# Patient Record
Sex: FEMALE | Race: WHITE | Employment: UNEMPLOYED | URBAN - METROPOLITAN AREA
[De-identification: names, ages, dates, MRNs, and addresses within clinical notes are randomized per-mention and may not be internally consistent; named-entity substitution may affect disease eponyms.]

---

## 2022-10-31 ENCOUNTER — HOSPITAL ENCOUNTER (OUTPATIENT)
Dept: PHYSICAL THERAPY | Age: 72
Discharge: HOME | End: 2022-10-31
Payer: MEDICARE

## 2022-10-31 PROCEDURE — 97530 THERAPEUTIC ACTIVITIES: CPT

## 2022-10-31 PROCEDURE — 97162 PT EVAL MOD COMPLEX 30 MIN: CPT

## 2022-10-31 NOTE — PROGRESS NOTES
PHYSICAL THERAPY - DAILY TREATMENT NOTE    Patient Name: Jerome Rudolph        Date: 10/31/2022  : 1950   YES Patient  Verified  Visit #:     Insurance: Payor: VA MEDICARE / Plan: VA MEDICARE PART A & B / Product Type: Medicare /      In time: 9:05 A Out time: 9:50 A   Total Treatment Time: 45     BCBS/Medicare Time Tracking (below)   Total Timed Codes (min):  45 1:1 Treatment Time:  45     TREATMENT AREA =  Left hip pain [M25.552]  SUBJECTIVE  Pain Level (on 0 to 10 scale):  4  / 10   Medication Changes/New allergies or changes in medical history, any new surgeries or procedures?     NO    If yes, update Summary List   Subjective Functional Status/Changes:  []  No changes reported     See POC            Modalities Rationale:     Patient deferred     min [] Estim, type/location:                                      []  att     []  unatt     []  w/US     []  w/ice    []  w/heat    min []  Mechanical Traction: type/lbs                   []  pro   []  sup   []  int   []  cont    []  before manual    []  after manual    min []  Ultrasound, settings/location:      min []  Iontophoresis w/ dexamethasone, location:                                               []  take home patch       []  in clinic    min []  Ice     []  Heat    location/position:     min []  Vasopneumatic Device, press/temp:    If using vaso (only need to measure limb vaso being performed on)      pre-treatment girth :       post-treatment girth :       measured at (landmark location) :      min []  Other:    [] Skin assessment post-treatment (if applicable):    []  intact    []  redness- no adverse reaction                  []redness - adverse reaction:        10 min Therapeutic Activity: [x]  See flow sheet   Rationale:    increase ROM, increase strength, and increase proprioception to improve the patients ability to return to pain-free bed mobility     Billed With/As:   [] TE   [] TA   [] Neuro   [] Self Care Patient Education: [x] Review HEP    [] Progressed/Changed HEP based on:   [] positioning   [] body mechanics   [] transfers   [] heat/ice application    [] other:      Other Objective/Functional Measures:    Issued initial HEP (supine bent knee fall out), discussed sleeping positioning on R with pillows for support to promote neutral hip position, initiate use of CP at home (hold off on previous use of heat)     Post Treatment Pain Level (on 0 to 10) scale:   4  / 10     ASSESSMENT  Assessment/Changes in Function:     See POC     []  See Progress Note/Recertification   Patient will continue to benefit from skilled PT services to modify and progress therapeutic interventions, address functional mobility deficits, address ROM deficits, address strength deficits, analyze and address soft tissue restrictions, analyze and cue movement patterns, analyze and modify body mechanics/ergonomics, assess and modify postural abnormalities, address imbalance/dizziness, and instruct in home and community integration to attain remaining goals.    Progress toward goals / Updated goals:    Progressing towards goals established at [address] [facility] [id number]  []  Upgrade activities as tolerated YES Continue plan of care   []  Discharge due to :    []  Other:      Therapist: Ernesto Conteh PT    Date: 10/31/2022 Time: 1:30 PM     Future Appointments   Date Time Provider Ayaka Aceves   11/2/2022  9:40 AM Shira Krishnan, PT [facility]   11/8/2022  9:40 AM Ros Camara, PT [facility]   11/11/2022 12:00 PM Shira Krishnan, PT [facility]   11/14/2022 11:40 AM Terry Cullen, PT [facility]   11/17/2022  7:20 AM Terry Cullen, PT [facility]   11/22/2022  9:00 AM Ros Camara, PT [facility]

## 2022-10-31 NOTE — PROGRESS NOTES
[address] [place] [address] PHYSICAL THERAPY AT [address] [place] [address]  [address], [address] ,[address], [address]  Phone: [phone]  Fax: [phone] / [address] [address] PHYSICAL THERAPY SERVICES  Patient Name: Gerhardt Layer : 1950   Medical   Diagnosis: Left hip pain [M25.552] Treatment Diagnosis: L hip pain   Onset Date: 6 mos prior to eval      [facility]   Prior Hospitalization: See medical history Provider #: [id number]   Prior Level of Function: Working out including daily walking & yoga 2 days/week   Comorbidities: R THR 2014, L [facility] 2009, OP, hearing impairment, L knee pain   Medications: Verified on Patient Summary List   The Plan of Care and following information is based on the information from the initial evaluation.   ==================================================================================  Assessment / key information:  Patient is a pleasant 71 y.o. female who presents to [facility] with L hip pain. Patient reports initial onset of sx 6 months ago which were of unknown etiology. She initially noticed c/o pain with yoga & when walking her dogs, she has DC previous yoga program & limits her walking program with her dog. Current c/o pain are constant in nature & worsen upon rising after periods of long sitting, prolonged standing/ambulation as well as c/o pain with ascending stairs (which tends to avoid). She reports having PT from July & 2022 as well as dry needling which helped with some reduction of pain. She reports some reduction of pain since steroid injection in L hip ~1 week ago, as well as injection in L knee last August. She reports her pain had increased to the point when that she was walking with a SPC 2 weeks ago. Sx improve with alleve bid & using once to manage her pain.    Average reported pain level at 4/10, 8/10 at worst & 3/10 at best.  Upon objective evaluation, patient demonstrates L hip AROM as follows 100 degrees in supine, [email or web address] degrees in supine, moderate c/o pain with ER<IR passively. Moderate signs of antalgia on L. C/o pain with rolling on plinth & repositioning, we discussed modifications to sleeping postures to reduce pain at night. MMT revealed L hip flexion 4/5, ABD 3/5, ext 3/5 all planes limited by moderate levels of pain today. Patient can benefit PT interventions to improve balance, gait mechanics, posture, and decrease pain to facilitate return to unlimited ADLs, work activities & overall functional status.   ==================================================================================  Eval Complexity: History HIGH Complexity :3+ comorbidities / personal factors will impact the outcome/ POC ;  Examination  MEDIUM Complexity : 3 Standardized tests and measures addressing body structure, function, activity limitation and / or participation in recreation ; Presentation MEDIUM Complexity : Evolving with changing characteristics ;   Decision Making MEDIUM Complexity : FOTO score of 26-74; Overall Complexity MEDIUM  Problem List: pain affecting function, decrease ROM, decrease strength, edema affecting function, impaired gait/ balance, decrease ADL/ functional abilitiies, decrease activity tolerance, decrease flexibility/ joint mobility, and decrease transfer abilities   Treatment Plan may include any combination of the following: Therapeutic exercise, Neuromuscular reeducation, Manual therapy, Therapeutic activity, Self care/home management, Electric stim unattended , Vasopneumatic device, Aquatic therapy, Gait training, Ultrasound, Mechanical traction, Electric stim attended, Needle insertion w/o injection (1 or 2 muscles), Needle insertion w/o injection (3+ muscles), and Canalith repositioning  Patient / Family readiness to learn indicated by: asking questions, trying to perform skills and interest  Persons(s) to be included in education: patient (P)  Barriers to Learning/Limitations: None  Measures taken:    Patient Goal (s): \"Pain in the hip & knee to goes away able to put full weight on L leg with no pain & do sitting pigeon in yoga\"   Patient self reported health status: excellent  Rehabilitation Potential: good  Short Term Goals: To be accomplished in  2  weeks:  1) Establish HEP to prevent further disability. 2) Patient will report decreased c/o pain to < or = 5-6/10 at worst to facilitate return to pain-free standing with manageable sx in L hip.  3) Improve FOTO score from 47 points to > or = 55 points indicating improved tolerance with ADLs in regards to L hip. 4) Patient to be able to perform FWD step up from 4-6 inch step with good pelvis/knee stability & no c/o pain in preparation for return to stair negotiation. Long Term Goals: To be accomplished in  4  weeks:  1) Improve FOTO score from 55 points to > or = 61 points indicating improved tolerance with ADLs in regards to L hip. 2) Patient to report 75% improvement in overall function in preparation for return to recreational activities with manageable sx in L hip. 3) Patient will be able to negotiate multiple steps using 1 HR & reciprocal pattern with no safety concerns noted. 4) Patient to report no c/o pain with rolling & bed mobility with manageable sx in L hip  Frequency / Duration:   Patient to be seen  2-3  times per week for 4  weeks:  Patient / Caregiver education and instruction: self care, activity modification, brace/ splint application and exercises    Therapist Signature: KATIE Tolbert, taurus MDT Date: 14/03/2450   Certification Period: 10-31-22 to 1-29-23 Time: 9:08 AM   =================================================================================  I certify that the above Physical Therapy Services are being furnished while the patient is under my care.   I agree with the treatment plan and certify that this therapy is necessary.     Physician Signature:                                                             Date:                                     Time:                                                                       Aurora Stuartma

## 2022-11-02 ENCOUNTER — HOSPITAL ENCOUNTER (OUTPATIENT)
Dept: PHYSICAL THERAPY | Age: 72
Discharge: HOME | End: 2022-11-02
Payer: MEDICARE

## 2022-11-02 PROCEDURE — 97116 GAIT TRAINING THERAPY: CPT | Performed by: PHYSICAL THERAPIST

## 2022-11-02 PROCEDURE — 97110 THERAPEUTIC EXERCISES: CPT | Performed by: PHYSICAL THERAPIST

## 2022-11-02 NOTE — PROGRESS NOTES
PHYSICAL THERAPY - DAILY TREATMENT NOTE    Patient Name: Femi Smith        Date: 2022  : 1950   YES Patient  Verified  Visit #:     Insurance: Payor: Ryan Chino / Plan: VA MEDICARE PART A & B / Product Type: Medicare /      In time: 9:40 Out time: 10:25   Total Treatment Time: 45     BCBS/Medicare Time Tracking (below)   Total Timed Codes (min):  45 1:1 Treatment Time:  45     TREATMENT AREA =  Left hip pain [M25.552]    SUBJECTIVE  Pain Level (on 0 to 10 scale):  4  / 10   Medication Changes/New allergies or changes in medical history, any new surgeries or procedures? NO    If yes, update Summary List   Subjective Functional Status/Changes:  []  No changes reported     Pt reports her pain is getting better and she has stopped taking NSAIDs as frequently. She did walk with her dog yesterday and her pain worsened.           Modalities Rationale:     decrease edema, decrease inflammation, and decrease pain to improve patient's ability to prevent soreness   min [] Estim, type/location:                                      []  att     []  unatt     []  w/US     []  w/ice    []  w/heat    min []  Mechanical Traction: type/lbs                   []  pro   []  sup   []  int   []  cont    []  before manual    []  after manual    min []  Ultrasound, settings/location:      min []  Iontophoresis w/ dexamethasone, location:                                               []  take home patch       []  in clinic   HEP min [x]  Ice     []  Heat    location/position: L Hip - supine after session    min []  Vasopneumatic Device, press/temp:     min []  Other:    [] Skin assessment post-treatment (if applicable):    []  intact    []  redness- no adverse reaction     []redness - adverse reaction:        20 min Therapeutic Exercise:  [x]  See flow sheet   Rationale:      increase ROM, increase strength, and improve coordination to improve the patients ability to restore normal walking tolerance       10 min Gait Training:  _40__ feet with _SPC__ device on level surfaces with _S__ level of assistance, pt able to avoid Trendelenburg on L LE when using a SPC in her R hand   Rationale: To improve ambulation safety and efficiency in order to improve patient's ability to safely ambulate at home for self care. Billed With/As:   [] TE   [] TA   [] Neuro   [] Self Care Patient Education: [x] Review HEP    [] Progressed/Changed HEP based on:   [] positioning   [] body mechanics   [] transfers   [] heat/ice application    [] other:      Other Objective/Functional Measures: Added hooklying resisted hip ABD with yellow band (issued for home use)    Added bridging today    Full L knee extension, and no pain when WB through L knee during SLS, but she has difficulty controlling frontal plane stability in standing      Post Treatment Pain Level (on 0 to 10) scale:   4  / 10     ASSESSMENT  Assessment/Changes in Function:     Pt was encouraged to walk with equal gait mechanics, and this could only be done today when using a SPC in R hand. She was educated to avoid taking too big a step, so she could effectively manage her symptoms. []  See Progress Note/Recertification   Patient will continue to benefit from skilled PT services to modify and progress therapeutic interventions, address functional mobility deficits, address ROM deficits, address strength deficits, analyze and address soft tissue restrictions, analyze and cue movement patterns, analyze and modify body mechanics/ergonomics, and assess and modify postural abnormalities to attain remaining goals. Progress toward goals / Updated goals: Showing improved movement mechanics, but still limited in ability to walk without symptoms.        PLAN  [x]  Upgrade activities as tolerated YES Continue plan of care   []  Discharge due to :    []  Other:      Therapist: Maggi Klein PT    Date: 11/2/2022 Time: 8:18 AM     Future Appointments   Date Time Provider Ayaka Aceves   11/2/2022  9:40 AM Sally Rinaldi, PT [facility]   11/8/2022  9:40 AM Jalyn Martinez, PT [facility]   11/11/2022 12:00 PM Sally Rinaldi, PT [facility]   11/14/2022 11:40 AM Jalyn Martinez, PT [facility]   11/17/2022  7:20 AM Jalyn Martinez, PT [facility]   11/22/2022  9:00 AM Raphael Camara, PT [facility]

## 2022-11-08 ENCOUNTER — HOSPITAL ENCOUNTER (OUTPATIENT)
Dept: PHYSICAL THERAPY | Age: 72
Discharge: HOME | End: 2022-11-08
Payer: MEDICARE

## 2022-11-08 PROCEDURE — 97535 SELF CARE MNGMENT TRAINING: CPT

## 2022-11-08 PROCEDURE — 97530 THERAPEUTIC ACTIVITIES: CPT

## 2022-11-08 PROCEDURE — 97110 THERAPEUTIC EXERCISES: CPT

## 2022-11-08 NOTE — PROGRESS NOTES
PHYSICAL THERAPY - DAILY TREATMENT NOTE    Patient Name: Edgar Galicia        Date: 2022  : 1950   YES Patient  Verified  Visit #:   3   of   12  Insurance: Payor: VA MEDICARE / Plan: VA MEDICARE PART A & B / Product Type: Medicare /      In time: 9:45 A Out time: 10:35 A   Total Treatment Time: 50     BCBS/Medicare Time Tracking (below)   Total Timed Codes (min):  40 1:1 Treatment Time:  40     TREATMENT AREA =  Left hip pain [M25.552]    SUBJECTIVE  Pain Level (on 0 to 10 scale):  4  / 10   Medication Changes/New allergies or changes in medical history, any new surgeries or procedures? NO    If yes, update Summary List   Subjective Functional Status/Changes:  []  No changes reported     Patient reports she had some pain after last PT & was on a cane for ~ 3 days. She started to feel a little better on .              Modalities Rationale:     decrease edema, decrease inflammation, and decrease pain to improve patient's ability to return to pain-free ADLs    min [] Estim, type/location:                                      []  att     []  unatt     []  w/US     []  w/ice    []  w/heat    min []  Mechanical Traction: type/lbs                   []  pro   []  sup   []  int   []  cont    []  before manual    []  after manual    min []  Ultrasound, settings/location:      min []  Iontophoresis w/ dexamethasone, location:                                               []  take home patch       []  in clinic   10 min [x]  Ice     []  Heat    location/position: Supine to L hip     min []  Vasopneumatic Device, press/temp:    If using vaso (only need to measure limb vaso being performed on)      pre-treatment girth :       post-treatment girth :       measured at (landmark location) :      min []  Other:    [] Skin assessment post-treatment (if applicable):    [x]  intact    [x]  redness- no adverse reaction                  []redness - adverse reaction:        20 min Therapeutic Exercise:  [x]  See flow sheet   Rationale:      increase ROM and increase strength to improve the patients ability to return to pain-free walking program      10 min Therapeutic Activity: [x]  See flow sheet   Rationale:    increase ROM, increase strength, improve balance, and increase proprioception to improve the patients ability to     10 min Self Care: Reviewed use of shopping cart with ambulation to assist as well as SPC on R to avoid    Rationale:    increase ROM, improve balance, and increase proprioception to improve the patients ability to return to normalized gait      Billed With/As:   [] TE   [] TA   [] Neuro   [] Self Care Patient Education: [x] Review HEP    [] Progressed/Changed HEP based on:   [] positioning   [] body mechanics   [] transfers   [] heat/ice application    [] other:      Other Objective/Functional Measures:    See flow sheet     Post Treatment Pain Level (on 0 to 10) scale:   4  / 10     ASSESSMENT  Assessment/Changes in Function:     Fair tolerance to S/L clam with 1 pillow between legs, unable to tolerate prone hip extension despite use of pillow as well as single knee to chest      []  See Progress Note/Recertification   Patient will continue to benefit from skilled PT services to modify and progress therapeutic interventions, address functional mobility deficits, address ROM deficits, address strength deficits, analyze and address soft tissue restrictions, analyze and cue movement patterns, analyze and modify body mechanics/ergonomics, assess and modify postural abnormalities, address imbalance/dizziness, and instruct in home and community integration to attain remaining goals.    Progress toward goals / Updated goals:    Progressing towards STG 2, STG 1 met      PLAN  []  Upgrade activities as tolerated YES Continue plan of care   []  Discharge due to :    []  Other:      Therapist: Krystal Cage PT    Date: 11/8/2022 Time: 9:45 AM     Future Appointments   Date Time Provider Department [place]   11/11/2022 12:00 PM Carl Sandoval [facility] [place] [facility]   11/14/2022 11:40 AM Phi Cortez, PT [facility]   11/17/2022  7:20 AM Phi Cortez PT [facility]   11/22/2022  9:00 AM Mckinley Camara, PT [facility]

## 2022-11-11 ENCOUNTER — HOSPITAL ENCOUNTER (OUTPATIENT)
Dept: PHYSICAL THERAPY | Age: 72
Discharge: HOME | End: 2022-11-11
Payer: MEDICARE

## 2022-11-11 PROCEDURE — 97110 THERAPEUTIC EXERCISES: CPT

## 2022-11-11 PROCEDURE — 97116 GAIT TRAINING THERAPY: CPT

## 2022-11-11 PROCEDURE — 97530 THERAPEUTIC ACTIVITIES: CPT

## 2022-11-11 NOTE — PROGRESS NOTES
PHYSICAL THERAPY - DAILY TREATMENT NOTE    Patient Name: Farzaneh Limon        Date: 2022  : 1950   YES Patient  Verified  Visit #:     Insurance: Payor: Amber Nones / Plan: VA MEDICARE PART A & B / Product Type: Medicare /      In time: 1000 Out time: 1055   Total Treatment Time: 55     BCBS/Medicare Time Tracking (below)   Total Timed Codes (min):  45 1:1 Treatment Time:  45     TREATMENT AREA =  Left hip pain [M25.552]    SUBJECTIVE  Pain Level (on 0 to 10 scale):  4  / 10   Medication Changes/New allergies or changes in medical history, any new surgeries or procedures? NO    If yes, update Summary List   Subjective Functional Status/Changes:  []  No changes reported     Pt reports hip pain is overshadowed today by severe L knee pain. She had difficulty standing this morning. Modalities Rationale:     decrease edema, decrease inflammation, and decrease pain to improve patient's ability to perform pain-free ADLs.     min [] Estim, type/location:                                      []  att     []  unatt     []  w/US     []  w/ice    []  w/heat    min []  Mechanical Traction: type/lbs                   []  pro   []  sup   []  int   []  cont    []  before manual    []  after manual    min []  Ultrasound, settings/location:      min []  Iontophoresis w/ dexamethasone, location:                                               []  take home patch       []  in clinic   10 min [x]  Ice     []  Heat    location/position: L knee and hip    min []  Vasopneumatic Device, press/temp:    If using vaso (only need to measure limb vaso being performed on)      pre-treatment girth :       post-treatment girth :       measured at (landmark location) :      min []  Other:    [x] Skin assessment post-treatment (if applicable):    [x]  intact    [x]  redness- no adverse reaction                  []redness - adverse reaction:        20 min Therapeutic Exercise:  [x]  See flow sheet   Rationale: increase ROM, increase strength, improve coordination, and increase proprioception to improve the patients ability to perform unlimited ADLs. 15 min Therapeutic Activity: [x]  See flow sheet   Rationale:    increase ROM, increase strength, improve coordination, and increase proprioception to improve the patients ability to sleep without pain    10 min Gait Trainin feet with no AD device on level surfaces with emphasis on glut and quad activation during midstance   Rationale: To improve ambulation safety and efficiency in order to improve patient's ability to safely ambulate at home for self care. Billed With/As:   [x] TE   [] TA   [] Neuro   [] Self Care Patient Education: [x] Review HEP    [] Progressed/Changed HEP based on:   [] positioning   [] body mechanics   [] transfers   [] heat/ice application    [] other:      Other Objective/Functional Measures: Added standing weight shift during, long sit HS and quad set     Post Treatment Pain Level (on 0 to 10) scale:   4  / 10     ASSESSMENT  Assessment/Changes in Function:     Pt was limited today due to inc knee pain that improved after some light stretching. Good performance of weight shifting at parallel bars but pt had significant glut fatigue noted. She was encouraged to monitor soreness levels and educated on difference between sharp/inflammatory pain and ms burn pain and encouraged to stop if exercises become painful.     []  See Progress Note/Recertification   Patient will continue to benefit from skilled PT services to modify and progress therapeutic interventions, address functional mobility deficits, address ROM deficits, address strength deficits, analyze and address soft tissue restrictions, analyze and cue movement patterns, analyze and modify body mechanics/ergonomics, and assess and modify postural abnormalities to attain remaining goals. Progress toward goals / Updated goals:    Progressing towards STG 3.      PLAN  [x]  Upgrade activities as tolerated YES Continue plan of care   []  Discharge due to :    []  Other:      Therapist: Carmen Abreu DPT    Date: 11/11/2022 Time: 12:53 PM     Future Appointments   Date Time Provider Ayaka Aceves   11/14/2022 11:40 AM Mary Fernandez, PT [facility]   11/17/2022  7:20 AM Mary Fernandez, PT [facility]   11/22/2022  9:00 AM Jaye Camara, PT [facility]

## 2022-11-14 ENCOUNTER — HOSPITAL ENCOUNTER (OUTPATIENT)
Dept: PHYSICAL THERAPY | Age: 72
Discharge: HOME | End: 2022-11-14
Payer: MEDICARE

## 2022-11-14 PROCEDURE — 97112 NEUROMUSCULAR REEDUCATION: CPT

## 2022-11-14 PROCEDURE — 97110 THERAPEUTIC EXERCISES: CPT

## 2022-11-14 PROCEDURE — 97116 GAIT TRAINING THERAPY: CPT

## 2022-11-14 NOTE — PROGRESS NOTES
PHYSICAL THERAPY - DAILY TREATMENT NOTE    Patient Name: Kentrell Ireland        Date: 2022  : 1950   YES Patient  Verified  Visit #:     Insurance: Payor: Evie Gomez / Plan: [place] MEDICARE PART A & B / Product Type: Medicare /      In time: 11:40 A Out time: 12:30 P   Total Treatment Time: 50     BCBS/Medicare Time Tracking (below)   Total Timed Codes (min):  40 1:1 Treatment Time:  40     TREATMENT AREA =  Left hip pain [M25.552]    SUBJECTIVE  Pain Level (on 0 to 10 scale):  5  / 10   Medication Changes/New allergies or changes in medical history, any new surgeries or procedures? NO    If yes, update Summary List   Subjective Functional Status/Changes:  []  No changes reported     Patient reports she did well after last PT but she had pain over the weekend.   She has a lot of pain in her  knee & hip, she had her last injection for her L knee in August.          Modalities Rationale:     decrease edema, decrease inflammation, and decrease pain to improve patient's ability to return to pain-free ADLs    min [] Estim, type/location:                                      []  att     []  unatt     []  w/US     []  w/ice    []  w/heat    min []  Mechanical Traction: type/lbs                   []  pro   []  sup   []  int   []  cont    []  before manual    []  after manual    min []  Ultrasound, settings/location:      min []  Iontophoresis w/ dexamethasone, location:                                               []  take home patch       []  in clinic   10 min [x]  Ice     []  Heat    location/position: Supine to L hip & L knee     min []  Vasopneumatic Device, press/temp:    If using vaso (only need to measure limb vaso being performed on)      pre-treatment girth :       post-treatment girth :       measured at (landmark location) :      min []  Other:    [] Skin assessment post-treatment (if applicable):    [x]  intact    [x]  redness- no adverse reaction                  []redness - adverse reaction:        20 min Therapeutic Exercise:  [x]  See flow sheet   Rationale:      increase ROM and increase strength to improve the patients ability to return to pain-free standing     10 min Neuromuscular Re-ed: [x]  See flow sheet   Rationale:    improve balance to improve the patients ability to return to ambulation with = weightbearing    10 min Gait Training:  _200__ feet with __SPC on R_ device on level surfaces with __supervision_ level of assistance   Rationale: To improve ambulation safety and efficiency in order to improve patient's ability to safely ambulate at home for self care. Billed With/As:   [] TE   [] TA   [] Neuro   [] Self Care Patient Education: [x] Review HEP    [] Progressed/Changed HEP based on:   [] positioning   [] body mechanics   [] transfers   [] heat/ice application    [] other:      Other Objective/Functional Measures: Added standing hip ABD today, reviewed consistent use of SPC to improve gait mechanics     Post Treatment Pain Level (on 0 to 10) scale:   4  / 10     ASSESSMENT  Assessment/Changes in Function:     Slow progress with pain reduction      []  See Progress Note/Recertification   Patient will continue to benefit from skilled PT services to modify and progress therapeutic interventions, address functional mobility deficits, address ROM deficits, address strength deficits, analyze and address soft tissue restrictions, analyze and cue movement patterns, analyze and modify body mechanics/ergonomics, assess and modify postural abnormalities, address imbalance/dizziness, and instruct in home and community integration to attain remaining goals.    Progress toward goals / Updated goals:    Progressing towards STG 2     PLAN  []  Upgrade activities as tolerated YES Continue plan of care   []  Discharge due to :    []  Other:      Therapist: Krystal Cage PT    Date: 11/14/2022 Time: 12:31 PM     Future Appointments   Date Time Provider Ayaka Aceves   11/17/2022 7:20 AM Sanjuanita Asher, PT [facility]   11/22/2022  9:00 AM Lolly Camara, PT [facility]

## 2022-11-17 ENCOUNTER — HOSPITAL ENCOUNTER (OUTPATIENT)
Dept: PHYSICAL THERAPY | Age: 72
Discharge: HOME | End: 2022-11-17
Payer: MEDICARE

## 2022-11-17 PROCEDURE — 97110 THERAPEUTIC EXERCISES: CPT

## 2022-11-17 PROCEDURE — 97112 NEUROMUSCULAR REEDUCATION: CPT

## 2022-11-17 PROCEDURE — 97535 SELF CARE MNGMENT TRAINING: CPT

## 2022-11-17 NOTE — PROGRESS NOTES
[address] [place] [address] PHYSICAL THERAPY AT [address] [place] [address]  [address], [address] ,[address], [address]  Phone: [phone]  Fax: [phone]  PROGRESS NOTE  Patient Name: Taylor Jimenez : 1950   Treatment/Medical Diagnosis: Left hip pain [M25.552]   Referral Source: [place], [place]     Date of Initial Visit: 10-31-22 Attended Visits: 6 Missed Visits: 0     SUMMARY OF TREATMENT  Therapeutic exercise including ROM, stretching, gentle strengthening, gait training, postural ed, patient education, HEP instruction, CP. CURRENT STATUS  Mrs. Asher Kent reports recent increase in L knee pain over the last 2 weeks which has limited progression of treatment in regards to L hip. C/o L knee pain is fairly constant in nature with average pain level at 9/10. She has been encouraged to be use SPC more & previous c/o L hip pain have now improved. Average pain level in L hip at 3/10, 5/10 at worst.  She was taking alleve 4xs/day for 2 weeks & reported no reduction in overall pain, she DC taking these this past week. She is unable to walk her dog & is avoiding all ambulation outside of her home. She reports 70% reduction in L knee pain after steroid injection last August, but reduction of pain was temporary in nature. This past week she has been experiencing c/o pain at night that is disrupting her sleep. We have discussed modifications to current home program & use of SPC to decrease c/o L LE pain. Goal/Measure of Progress Goal Met? 1.  Establish HEP to prevent further disability. Status at last Eval: NA Current Status: HEP established  yes   2. Patient will report decreased c/o pain to < or = 5-6/10 at worst to facilitate return to pain-free standing with manageable sx in L hip. Status at last Eval: 8/10 at worst Current Status: 5/10 at worst (although now walking assisted with [facility]) progressing   3.   Improve FOTO score from 47 points to > or = 55 points indicating improved tolerance with ADLs in regards to L hip. Status at last Eval: 47 Current Status: To be assessed  progressing     New Goals to be achieved in __3-4__  weeks:  1. Patient will report decreased c/o pain to < or = 5-6/10 at worst to facilitate return to pain-free standing with manageable sx in L hip. 2.  Improve FOTO score from 47 points to > or = 55 points indicating improved tolerance with ADLs in regards to L hip. 3.  Patient to be able to perform FWD step up from 4-6 inch step with good pelvis/knee stability & no c/o pain in preparation for return to stair negotiation. 4.  Patient will ambulate without SPC for limited community ambulation feet with no signs of antalgia on L.       RECOMMENDATIONS  Patient scheduled for follow up with MD on 11-22-22, please indicate any changes to current treatment regarding persistent c/o L knee pain. If you have any questions/comments please contact us directly at [phone]. Thank you for allowing us to assist in the care of your patient. Therapist Signature: KATIE Sy, cert MDT Date: 07/56/7293   Certification Period:  Reporting Period: 11-17-22 to 2-15-23  10-31-22 to 11-17-22 Time: 7:25 AM     NOTE TO PHYSICIAN:  PLEASE COMPLETE THE ORDERS BELOW AND FAX TO   [facility] [place] [facility]: [phone]. If you are unable to process this request in 24 hours please contact our office: [phone].    ___ I have read the above report and request that my patient continue as recommended.   ___ I have read the above report and request that my patient continue therapy with the following changes/special instructions:_________________________________________________________   ___ I have read the above report and request that my patient be discharged from therapy.      Physician Signature:                                                             Date:                                     Time: Baylee Stovall [place]

## 2022-11-17 NOTE — PROGRESS NOTES
PHYSICAL THERAPY - DAILY TREATMENT NOTE    Patient Name: Bhupendra Parr        Date: 2022  : 1950   YES Patient  Verified  Visit #:     Insurance: Payor: Gilbert Russo / Plan: [place] MEDICARE PART A & B / Product Type: Medicare /      In time: 7:20 A Out time: 8:10 A   Total Treatment Time: 50     BCBS/Medicare Time Tracking (below)   Total Timed Codes (min):  40 1:1 Treatment Time:  40     TREATMENT AREA =  Left hip pain [M25.552]    SUBJECTIVE  Pain Level (on 0 to 10 scale):  4  / 10   Medication Changes/New allergies or changes in medical history, any new surgeries or procedures?     NO    If yes, update Summary List   Subjective Functional Status/Changes:  []  No changes reported     See progress note to MD            Modalities Rationale:     decrease edema, decrease inflammation, and decrease pain to improve patient's ability to return to pain-free ADLs    min [] Estim, type/location:                                      []  att     []  unatt     []  w/US     []  w/ice    []  w/heat    min []  Mechanical Traction: type/lbs                   []  pro   []  sup   []  int   []  cont    []  before manual    []  after manual    min []  Ultrasound, settings/location:      min []  Iontophoresis w/ dexamethasone, location:                                               []  take home patch       []  in clinic   10 min [x]  Ice     []  Heat    location/position: Supine to L knee & hip    min []  Vasopneumatic Device, press/temp:    If using vaso (only need to measure limb vaso being performed on)      pre-treatment girth :       post-treatment girth :       measured at (landmark location) :      min []  Other:    [] Skin assessment post-treatment (if applicable):    [x]  intact    [x]  redness- no adverse reaction                  []redness - adverse reaction:        15 min Therapeutic Exercise:  [x]  See flow sheet   Rationale:      increase ROM and increase strength to improve the patients ability to return to pain-free ADLs     15 min Self Care: Reviewed modifications on current HEP given c/o knee pain, plan to hold off on PT until after scheduled follow up with MD (and scheduled cataracts surgery)   Rationale:    increase ROM and increase strength to improve the patients ability to return to pain-free walking program    10 min Neuromuscular Re-ed: [x]  See flow sheet   Rationale:    improve coordination, improve balance, and increase proprioception to improve the patients ability to return to standing with = weightbearing on L LE      Billed With/As:   [] TE   [] TA   [] Neuro   [] Self Care Patient Education: [x] Review HEP    [] Progressed/Changed HEP based on:   [] positioning   [] body mechanics   [] transfers   [] heat/ice application    [] other:      Other Objective/Functional Measures:    See flow sheet     Post Treatment Pain Level (on 0 to 10) scale:   4  / 10     ASSESSMENT  Assessment/Changes in Function:     C/o L knee pain with transitioning from flexed to extended position of L knee in nonweightbearing, reduction of L knee pain since consistent use of SPC      []  See Progress Note/Recertification   Patient will continue to benefit from skilled PT services to modify and progress therapeutic interventions, address functional mobility deficits, address ROM deficits, address strength deficits, analyze and address soft tissue restrictions, analyze and cue movement patterns, analyze and modify body mechanics/ergonomics, assess and modify postural abnormalities, and instruct in home and community integration to attain remaining goals.    Progress toward goals / Updated goals:    See progress note for progress with goals      PLAN  []  Upgrade activities as tolerated YES Continue plan of care   []  Discharge due to :    []  Other:      Therapist: Luis Cadena PT    Date: 11/17/2022 Time: 7:24 AM     Future Appointments   Date Time Provider Ayaka Aceves   11/22/2022  9:00 AM [facility]   12/8/2022 10:40 AM Ramon Hanley, PT [facility]   12/15/2022 12:40 PM Ramon Hanley, PT [facility]   12/22/2022 12:00 PM Ramon Hanley, PT [facility]   12/29/2022 12:00 PM Elza Camara, PT [facility]

## 2022-11-22 ENCOUNTER — APPOINTMENT (OUTPATIENT)
Dept: PHYSICAL THERAPY | Age: 72
End: 2022-11-22
Payer: MEDICARE

## 2022-12-08 ENCOUNTER — APPOINTMENT (OUTPATIENT)
Dept: PHYSICAL THERAPY | Age: 72
End: 2022-12-08

## 2022-12-15 ENCOUNTER — APPOINTMENT (OUTPATIENT)
Dept: PHYSICAL THERAPY | Age: 72
End: 2022-12-15

## 2022-12-22 ENCOUNTER — APPOINTMENT (OUTPATIENT)
Dept: PHYSICAL THERAPY | Age: 72
End: 2022-12-22

## 2022-12-29 ENCOUNTER — APPOINTMENT (OUTPATIENT)
Dept: PHYSICAL THERAPY | Age: 72
End: 2022-12-29

## 2023-01-12 ENCOUNTER — HOSPITAL ENCOUNTER (OUTPATIENT)
Dept: PHYSICAL THERAPY | Age: 73
Discharge: HOME | End: 2023-01-12
Payer: MEDICARE

## 2023-01-12 PROCEDURE — 97110 THERAPEUTIC EXERCISES: CPT

## 2023-01-12 PROCEDURE — 97162 PT EVAL MOD COMPLEX 30 MIN: CPT

## 2023-01-12 NOTE — PROGRESS NOTES
[address] [place] [address] PHYSICAL THERAPY AT [address] [place] [address]  [facility], [address] [place] [address], [address]  Phone: [phone]  Fax: [phone] / [address] [address] PHYSICAL THERAPY SERVICES  Patient Name: Linda Montana : 1950   Medical   Diagnosis: Left knee pain [M25.562] Treatment Diagnosis: L knee pain   Onset Date: chronic     Referral Source: Neri Aceves MD [facility] of [facility]): 2023   Prior Hospitalization: See medical history Provider #: [id number]   Prior Level of Function: Limited with prolonged walking    Comorbidities: H/o LBP & L hip pain & R knee pain   Medications: Verified on Patient Summary List   The Plan of Care and following information is based on the information from the initial evaluation.   ==================================================================================  Assessment / key information:  Patient is a pleasant 71 y.o. female who presents to [facility] with L knee pain. She was last seen for PT in mid November & was DC due to persistent c/o L knee & hip pain. Sx have improved since steroid injection on 22 & she is scheduled for L TKR on 3-01-23. She has since returned to yoga classes 2xs/week but is still unable to walk her dog, she is no longer using [facility] for support. Average reported pain level at 2-3/10, 4/10 at worst & 2/10 at best.  Upon objective evaluation, patient demonstrates L knee AROM as follows 2-135 degrees, she is able to achieve full passive extension with mild pain with passive overpressure to L knee flexion>extension. MMT revealed  improved hip strength 4/5 for L hip ABD, ext & 4+/5 hip flexion. Overall L knee strength 4+/5, good quad set noted today mild c/o anterior knee pain with quad set.    Patient can benefit PT interventions to improve strength, decrease pain & swelling & improve gait mechanics to facilitate return to unlimited ADLs, work activities & overall functional status.   ==================================================================================  Eval Complexity: History HIGH Complexity :3+ comorbidities / personal factors will impact the outcome/ POC ;  Examination  MEDIUM Complexity : 3 Standardized tests and measures addressing body structure, function, activity limitation and / or participation in recreation ; Presentation MEDIUM Complexity : Evolving with changing characteristics ; Decision Making MEDIUM Complexity : FOTO score of 26-74; Overall Complexity MEDIUM  Problem List: pain affecting function, decrease ROM, decrease strength, edema affecting function, impaired gait/ balance, decrease ADL/ functional abilitiies, decrease activity tolerance, decrease flexibility/ joint mobility, and decrease transfer abilities   Treatment Plan may include any combination of the following: Therapeutic exercise, Neuromuscular reeducation, Manual therapy, Therapeutic activity, Self care/home management, Electric stim unattended , Vasopneumatic device, Aquatic therapy, Gait training, Ultrasound, Mechanical traction, Electric stim attended, and Needle insertion w/o injection (1 or 2 muscles)  Patient / Family readiness to learn indicated by: asking questions, trying to perform skills and interest  Persons(s) to be included in education: patient (P)  Barriers to Learning/Limitations: None  Measures taken:    Patient Goal (s): \"Strengthen muscles in both knees in preparation for TKR 3/01/23\"   Patient self reported health status: excellent  Rehabilitation Potential: good  Short Term Goals: To be accomplished in  2  weeks:  1) Establish HEP to prevent further disability. 2) Patient will report decreased c/o pain to < or = 2/10 to facilitate return to walking program with manageable sx in L knee. 3) Establish baseline FOTO to determine current level of function.   4) Patient will be able to maintain L SLS for 10 seconds indicating improved use of  balance strategy for safety with ambulation in challenging environments. Long Term Goals: To be accomplished in  4  weeks:  1) Patient will be able to achieve full passive knee extension on L to normalize gait mechanics. 2) Patient to be independent & compliant with HEP in preparation for D/C.  3) Patient to be able to perform FWD step down from 4 inch step with good pelvis/knee stability & no c/o pain in preparation for return to rec stair negotiation. Frequency / Duration:   Patient to be seen  2-3  times per week for 4  weeks:  Patient / Caregiver education and instruction: self care, activity modification, brace/ splint application and exercises    Therapist Signature: KATIE Chappell cert MDT Date: 2/93/5157   Certification Period: 1-12-23 to 4-10-23 Time: 12:10 PM   =================================================================================  I certify that the above Physical Therapy Services are being furnished while the patient is under my care. I agree with the treatment plan and certify that this therapy is necessary.     Physician Signature:                                                             Date:                                     Time:                                                                       Rafaela Israel MD

## 2023-01-12 NOTE — PROGRESS NOTES
PHYSICAL THERAPY - DAILY TREATMENT NOTE    Patient Name: Renate Napier        Date: 2023  : 1950   YES Patient  Verified  Visit #:     Insurance: Payor: [place] MEDICARE / Plan: VA MEDICARE PART A & B / Product Type: Medicare /      In time: 12:05 P Out time: 1:00 P   Total Treatment Time: 55     BCBS/Medicare Time Tracking (below)   Total Timed Codes (min):  55 1:1 Treatment Time:  55     TREATMENT AREA = Left knee pain [M25.562]    SUBJECTIVE  Pain Level (on 0 to 10 scale):  3  / 10   Medication Changes/New allergies or changes in medical history, any new surgeries or procedures?     NO    If yes, update Summary List   Subjective Functional Status/Changes:  []  No changes reported     See POC          Modalities Rationale:     Patient deferred   min [] Estim, type/location:                                      []  att     []  unatt     []  w/US     []  w/ice    []  w/heat    min []  Mechanical Traction: type/lbs                   []  pro   []  sup   []  int   []  cont    []  before manual    []  after manual    min []  Ultrasound, settings/location:      min []  Iontophoresis w/ dexamethasone, location:                                               []  take home patch       []  in clinic    min []  Ice     []  Heat    location/position:     min []  Vasopneumatic Device, press/temp:    If using vaso (only need to measure limb vaso being performed on)      pre-treatment girth :       post-treatment girth :       measured at (landmark location) :      min []  Other:    [] Skin assessment post-treatment (if applicable):    []  intact    []  redness- no adverse reaction                  []redness - adverse reaction:        15 min Therapeutic Exercise:  [x]  See flow sheet   Rationale:      increase ROM and increase strength to improve the patients ability to return to pain-free walking program      Billed With/As:   [] TE   [] TA   [] Neuro   [] Self Care Patient Education: [x] Review HEP    [] Progressed/Changed HEP based on:   [] positioning   [] body mechanics   [] transfers   [] heat/ice application    [] other:      Other Objective/Functional Measures:    See flow sheet     Post Treatment Pain Level (on 0 to 10) scale:   3  / 10     ASSESSMENT  Assessment/Changes in Function:     See POC      []  See Progress Note/Recertification   Patient will continue to benefit from skilled PT services to modify and progress therapeutic interventions, address functional mobility deficits, address ROM deficits, address strength deficits, analyze and address soft tissue restrictions, analyze and cue movement patterns, analyze and modify body mechanics/ergonomics, assess and modify postural abnormalities, address imbalance/dizziness, and instruct in home and community integration to attain remaining goals.    Progress toward goals / Updated goals:    Progressing towards goals established at [address] [facility] [id number]  []  Upgrade activities as tolerated YES Continue plan of care   []  Discharge due to :    []  Other:      Therapist: Karene Hodgkin, PT    Date: 1/12/2023 Time: 1:34 PM     Future Appointments   Date Time Provider Ayaka Aceves   1/17/2023 11:30 AM Tameka Bell, PT [facility]   1/19/2023 11:30 AM Tameka Bell, PT [facility]   1/24/2023 11:30 AM Tameka Bell, PT [facility]   1/26/2023 11:00 AM Tameka Bell, PT [facility]   1/31/2023 11:00 AM Vito-CruzReyes Paganini, PT [facility]   2/2/2023 11:30 AM Luz Camara, PT [facility]   2/7/2023 11:30 AM [facility] PT STEVE 1 [facility]   2/9/2023 11:30 AM [facility] PT STEVE 1 [facility]   2/14/2023 11:30 AM [facility] PT REDMILL 1 [facility]   2/16/2023 11:30 AM [facility] PT REDMILL 1 [facility]

## 2023-01-17 ENCOUNTER — HOSPITAL ENCOUNTER (OUTPATIENT)
Dept: PHYSICAL THERAPY | Age: 73
Discharge: HOME | End: 2023-01-17
Payer: MEDICARE

## 2023-01-17 PROCEDURE — 97140 MANUAL THERAPY 1/> REGIONS: CPT

## 2023-01-17 PROCEDURE — 97110 THERAPEUTIC EXERCISES: CPT

## 2023-01-17 PROCEDURE — 97112 NEUROMUSCULAR REEDUCATION: CPT

## 2023-01-17 NOTE — PROGRESS NOTES
PHYSICAL THERAPY - DAILY TREATMENT NOTE    Patient Name: Serjio Peterson        Date: 2023  : 1950   yes Patient  Verified  Visit #:     Insurance: Payor: Domingo Radford / Plan: [place] & B / Product Type: Medicare /      In time: 3711 Out time: 2490   Total Treatment Time: 45     Medicare/BCBS Time Tracking (below)   Total Timed Codes (min):  45 1:1 Treatment Time:  45     TREATMENT AREA =  Left knee pain [M25.562]    SUBJECTIVE  Pain Level (on 0 to 10 scale):  3  / 10   Medication Changes/New allergies or changes in medical history, any new surgeries or procedures?   no If yes, update Summary List   Subjective Functional Status/Changes:  []  No changes reported     Cortisone is still kickin in. OBJECTIVE  Modalities Rationale:     decrease inflammation, decrease pain, and increase tissue extensibility to improve patient's ability to perform functional mobility activities with decrease c/o symptoms.    min [] Estim, type/location:                                      []  att     []  unatt     []  w/US     []  w/ice    []  w/heat    min []  Mechanical Traction: type/lbs                   []  pro   []  sup   []  int   []  cont    []  before manual    []  after manual    min []  Ultrasound, settings/location:      min []  Iontophoresis w/ dexamethasone, location:                                               []  take home patch       []  in clinic    min []  Ice     []  Heat    location/position:     min []  Vasopneumatic Device, press/temp:    If using vaso (only need to measure limb vaso being performed on)      pre-treatment girth :       post-treatment girth :       measured at (landmark location) :      min []  Other:    [x] Skin assessment post-treatment (if applicable):    [x]  intact    []  redness- no adverse reaction                  []redness - adverse reaction:      20 min Therapeutic Exercise:  [x]  See flow sheet   Rationale:      increase ROM, increase strength, improve coordination, and improve balance to improve the patients ability to perform functional mobility activities with decrease c/o symptoms. 10 min Manual Therapy: PROM, HS stretch, STM   Rationale:      decrease pain, increase ROM, and increase tissue extensibility to improve patient's ability to perform functional mobility activities with decrease c/o symptoms. The manual therapy interventions were performed at a separate and distinct time from the therapeutic activities interventions. min Therapeutic Activity: [x]  See flow sheet   Rationale:    increase ROM, increase strength, improve coordination, and improve balance to improve the patients ability to perform functional mobility activities with decrease c/o symptoms. 15 min Neuromuscular Re-ed: [x]  See flow sheet   Rationale:    increase ROM, increase strength, improve coordination, and improve balance to improve the patients ability to perform functional mobility activities with decrease c/o symptoms     min Self Care:    Rationale:    increase ROM, increase strength, and improve coordination to improve the patients ability to perform functional mobility activities with decrease c/o symptoms. Billed With/As:   [x] TE   [] TA   [] Neuro   [] Self Care Patient Education: [x] Review HEP    [] Progressed/Changed HEP based on:   [] positioning   [] body mechanics   [] transfers   [] heat/ice application    [] other:      Other Objective/Functional Measures:    No briseno ein functional measurements today. Post Treatment Pain Level (on 0 to 10) scale:   2  / 10     ASSESSMENT  Assessment/Changes in Function:     Overall good tolerance to all therapeutic interventions for first treatment session.      []  See Progress Note/Recertification   Patient will continue to benefit from skilled PT services to modify and progress therapeutic interventions, address functional mobility deficits, address ROM deficits, address strength deficits, analyze and address soft tissue restrictions, and analyze and cue movement patterns to attain remaining goals. Progress toward goals / Updated goals:    Short Term Goals: To be accomplished in  2  weeks:  1) Establish HEP to prevent further disability. 2) Patient will report decreased c/o pain to < or = 2/10 to facilitate return to walking program with manageable sx in L knee. 3) Establish baseline FOTO to determine current level of function. 4) Patient will be able to maintain L SLS for 10 seconds indicating improved use of  balance strategy for safety with ambulation in challenging environments. Long Term Goals: To be accomplished in  4  weeks:  1) Patient will be able to achieve full passive knee extension on L to normalize gait mechanics. 2) Patient to be independent & compliant with HEP in preparation for D/C.  3) Patient to be able to perform FWD step down from 4 inch step with good pelvis/knee stability & no c/o pain in preparation for return to rec stair negotiation.       PLAN  [x]  Upgrade activities as tolerated yes Continue plan of care   []  Discharge due to :    []  Other:      Therapist: Drois Haji PTA    Date: 1/17/2023 Time: 10:39 AM     Future Appointments   Date Time Provider Ayaka Aceves   1/17/2023 11:30 AM [facility] PT [place] [facility]   1/19/2023 11:30 AM Minnie Mendez, PT [facility]   1/24/2023 11:30 AM Minnie Mendez, PT [facility]   1/26/2023 11:00 AM Minnie Mendez, PT [facility]   1/31/2023 11:00 AM Natalie Camara, PT [facility]   2/2/2023 11:30 AM Luz Camara, PT [facility]   2/7/2023 11:30 AM [facility] PT [place] 1 [facility]   2[facility]9/2023 11:30 AM [facility] PT [place] 1 [facility]   2/14/2023 11:30 AM [facility] PT [place] 1 [facility]   2/16/2023 11:30 AM [facility] PT [place] 1 [facility]

## 2023-01-19 ENCOUNTER — HOSPITAL ENCOUNTER (OUTPATIENT)
Dept: PHYSICAL THERAPY | Age: 73
Discharge: HOME | End: 2023-01-19
Payer: MEDICARE

## 2023-01-19 PROCEDURE — 97112 NEUROMUSCULAR REEDUCATION: CPT

## 2023-01-19 PROCEDURE — 97140 MANUAL THERAPY 1/> REGIONS: CPT

## 2023-01-19 PROCEDURE — 97110 THERAPEUTIC EXERCISES: CPT

## 2023-01-19 NOTE — PROGRESS NOTES
PHYSICAL THERAPY - DAILY TREATMENT NOTE    Patient Name: Edgardo Garay        Date: 2023  : 1950   yes Patient  Verified  Visit #:   3   of   12  Insurance: Payor: [facility] [employer] / Plan: [place] MEDICARE PART A & B / Product Type: Medicare /      In time: 8913 Out time:    Total Treatment Time: 45     Medicare/BCBS Time Tracking (below)   Total Timed Codes (min):  45 1:1 Treatment Time:  45     TREATMENT AREA =  Left knee pain [M25.562]    SUBJECTIVE  Pain Level (on 0 to 10 scale):  0 / 10   Medication Changes/New allergies or changes in medical history, any new surgeries or procedures?   no If yes, update Summary List   Subjective Functional Status/Changes:  []  No changes reported     I'm actually doing good today. OBJECTIVE  Modalities Rationale:     decrease inflammation, decrease pain, and increase tissue extensibility to improve patient's ability to perform functional mobility activities with decrease c/o symptoms.    min [] Estim, type/location:                                      []  att     []  unatt     []  w/US     []  w/ice    []  w/heat    min []  Mechanical Traction: type/lbs                   []  pro   []  sup   []  int   []  cont    []  before manual    []  after manual    min []  Ultrasound, settings/location:      min []  Iontophoresis w/ dexamethasone, location:                                               []  take home patch       []  in clinic    min []  Ice     []  Heat    location/position:     min []  Vasopneumatic Device, press/temp:    If using vaso (only need to measure limb vaso being performed on)      pre-treatment girth :       post-treatment girth :       measured at (landmark location) :      min []  Other:    [x] Skin assessment post-treatment (if applicable):    [x]  intact    []  redness- no adverse reaction                  []redness - adverse reaction:      20 min Therapeutic Exercise:  [x]  See flow sheet   Rationale:      increase ROM, increase strength, improve coordination, and improve balance to improve the patients ability to perform functional mobility activities with decrease c/o symptoms. 10 min Manual Therapy: PROM, HS stretch, STM   Rationale:      decrease pain, increase ROM, and increase tissue extensibility to improve patient's ability to perform functional mobility activities with decrease c/o symptoms. The manual therapy interventions were performed at a separate and distinct time from the therapeutic activities interventions. min Therapeutic Activity: [x]  See flow sheet   Rationale:    increase ROM, increase strength, improve coordination, and improve balance to improve the patients ability to perform functional mobility activities with decrease c/o symptoms. 15 min Neuromuscular Re-ed: [x]  See flow sheet   Rationale:    increase ROM, increase strength, improve coordination, and improve balance to improve the patients ability to perform functional mobility activities with decrease c/o symptoms     min Self Care:    Rationale:    increase ROM, increase strength, and improve coordination to improve the patients ability to perform functional mobility activities with decrease c/o symptoms. Billed With/As:   [x] TE   [] TA   [] Neuro   [] Self Care Patient Education: [x] Review HEP    [] Progressed/Changed HEP based on:   [] positioning   [] body mechanics   [] transfers   [] heat/ice application    [] other:      Other Objective/Functional Measures:    Continue with therx per flow sheet for strengthening and ROM of knee. Post Treatment Pain Level (on 0 to 10) scale:   2  / 10     ASSESSMENT  Assessment/Changes in Function:     No noted change in function reported today. Continues to perform all functional moiblity activities without significant restrictions.      []  See Progress Note/Recertification   Patient will continue to benefit from skilled PT services to modify and progress therapeutic interventions, address functional mobility deficits, address ROM deficits, address strength deficits, analyze and address soft tissue restrictions, and analyze and cue movement patterns to attain remaining goals. Progress toward goals / Updated goals:    Short Term Goals: To be accomplished in  2  weeks:  1) Establish HEP to prevent further disability. 2) Patient will report decreased c/o pain to < or = 2/10 to facilitate return to walking program with manageable sx in L knee. 3) Establish baseline FOTO to determine current level of function. 4) Patient will be able to maintain L SLS for 10 seconds indicating improved use of  balance strategy for safety with ambulation in challenging environments. Long Term Goals: To be accomplished in  4  weeks:  1) Patient will be able to achieve full passive knee extension on L to normalize gait mechanics. 2) Patient to be independent & compliant with HEP in preparation for D/C.  3) Patient to be able to perform FWD step down from 4 inch step with good pelvis/knee stability & no c/o pain in preparation for return to rec stair negotiation.       PLAN  [x]  Upgrade activities as tolerated yes Continue plan of care   []  Discharge due to :    []  Other:      Therapist: Troy Rouse PTA    Date: 1/19/2023 Time: 10:39 AM     Future Appointments   Date Time Provider Ayaka Aceves   1/19/2023 11:30 AM [facility] PT [place] 1 [facility]   1/24/2023 11:30 AM Barbara Nealmadi, PT [facility]   1/26/2023 11:00 AM Lonza Krystle, PT [facility]   1/31/2023 11:00 AM Gianna Camara, PT [facility]   2/2/2023 11:30 AM Luz Camara, PT [facility]   2/7/2023 11:30 AM [facility] PT [place] 1 [facility]   2/9/2023 11[facility]30 AM [facility] PT [place] 1 [facility]   2/14/2023 11:30 AM [facility] PT [place] 1 [facility]   2/16/2023 11:30 AM [facility] PT [place] 1 [facility]

## 2023-01-24 ENCOUNTER — HOSPITAL ENCOUNTER (OUTPATIENT)
Dept: PHYSICAL THERAPY | Age: 73
Discharge: HOME | End: 2023-01-24
Payer: MEDICARE

## 2023-01-24 PROCEDURE — 97112 NEUROMUSCULAR REEDUCATION: CPT

## 2023-01-24 PROCEDURE — 97140 MANUAL THERAPY 1/> REGIONS: CPT

## 2023-01-24 PROCEDURE — 97110 THERAPEUTIC EXERCISES: CPT

## 2023-01-24 NOTE — PROGRESS NOTES
PHYSICAL THERAPY - DAILY TREATMENT NOTE    Patient Name: Prisca Urbina        Date: 2023  : 1950   YES Patient  Verified  Visit #:     Insurance: Payor: [facility] MEDICARE / Plan: [place] MEDICARE PART A & B / Product Type: Medicare /      In time: 11:30 A Out time: 12:20 P   Total Treatment Time: 45     BCBS/Medicare Time Tracking (below)   Total Timed Codes (min):  45 1:1 Treatment Time:  40     TREATMENT AREA =  Left knee pain [M25.562]    SUBJECTIVE  Pain Level (on 0 to 10 scale):  0  / 10   Medication Changes/New allergies or changes in medical history, any new surgeries or procedures? NO    If yes, update Summary List   Subjective Functional Status/Changes:  []  No changes reported     Patient reports she is still doing yoga two days/week, she is having some back pain today. She has some pain in L knee, on occasion she has pain at night.           Modalities Rationale:     Patient deferred   min [] Estim, type/location:                                      []  att     []  unatt     []  w/US     []  w/ice    []  w/heat    min []  Mechanical Traction: type/lbs                   []  pro   []  sup   []  int   []  cont    []  before manual    []  after manual    min []  Ultrasound, settings/location:      min []  Iontophoresis w/ dexamethasone, location:                                               []  take home patch       []  in clinic    min []  Ice     []  Heat    location/position:     min []  Vasopneumatic Device, press/temp:    If using vaso (only need to measure limb vaso being performed on)      pre-treatment girth :       post-treatment girth :       measured at (landmark location) :      min []  Other:    [] Skin assessment post-treatment (if applicable):    []  intact    []  redness- no adverse reaction                  []redness - adverse reaction:        20/  15 min Therapeutic Exercise:  [x]  See flow sheet   Rationale:      increase ROM and increase strength to improve the patients ability to return to pain-free standing      15 min Neuromuscular Re-ed: [x]  See flow sheet   Rationale:    improve coordination, improve balance, and increase proprioception to improve the patients ability to return to ADls with decreased fall risk    10 min Manual Therapy: Gentle hamstring stretch, passive extension on L   Rationale:      increase ROM to improve patient's ability to tolerate ambulation with decreased extensor lag   The manual therapy interventions were performed at a separate and distinct time from the therapeutic activities interventions. Billed With/As:   [] TE   [] TA   [] Neuro   [] Self Care Patient Education: [x] Review HEP    [] Progressed/Changed HEP based on:   [] positioning   [] body mechanics   [] transfers   [] heat/ice application    [] other:      Other Objective/Functional Measures: Added TKE with peach band, SLS with 1 UE for support (see updated HEP)  FOTO 51 points (baseline)      Post Treatment Pain Level (on 0 to 10) scale:   0  / 10     ASSESSMENT  Assessment/Changes in Function:     Increase in L hip pain with supine SLR (held this today & with home program), v/c to maintain level pelvis with SLS      []  See Progress Note/Recertification   Patient will continue to benefit from skilled PT services to modify and progress therapeutic interventions, address functional mobility deficits, address ROM deficits, address strength deficits, analyze and address soft tissue restrictions, analyze and cue movement patterns, analyze and modify body mechanics/ergonomics, assess and modify postural abnormalities, address imbalance/dizziness, and instruct in home and community integration to attain remaining goals.    Progress toward goals / Updated goals:    Progressing towards LTGs & STG 3 met      PLAN  []  Upgrade activities as tolerated YES Continue plan of care   []  Discharge due to :    []  Other:      Therapist: Niko Acosta, PT    Date: 1/24/2023 Time: 11:36 AM     Future Appointments   Date Time Provider Ayaka Aceves   1/26/2023 11:00 AM Soraya Powell, PT [facility]   1/31/2023 11:00 AM Soraya Powell, PT [facility]   2/2/2023 11:30 AM Sunil Camara, PT [facility]   2/7/2023 11:30 AM [facility] PT [place] 1 [facility]   2/9/2023 11:30 AM [facility] PT [place] 1 [facility]   2/14/2023 11:30 AM [facility] PT [place] 1 [facility]   2/16/2023 11:30 AM [facility] PT [place] 1 [facility]

## 2023-01-26 ENCOUNTER — HOSPITAL ENCOUNTER (OUTPATIENT)
Dept: PHYSICAL THERAPY | Age: 73
Discharge: HOME | End: 2023-01-26
Payer: MEDICARE

## 2023-01-26 PROCEDURE — 97140 MANUAL THERAPY 1/> REGIONS: CPT

## 2023-01-26 PROCEDURE — 97110 THERAPEUTIC EXERCISES: CPT

## 2023-01-26 PROCEDURE — 97112 NEUROMUSCULAR REEDUCATION: CPT

## 2023-01-26 NOTE — PROGRESS NOTES
PHYSICAL THERAPY - DAILY TREATMENT NOTE    Patient Name: Valentina Dover        Date: 2023  : 1950   yes Patient  Verified  Visit #:    Insurance: Payor: Sacha Petit / Plan: [place] MEDICARE PART A & B / Product Type: Medicare /      In time: 8845 Out time: 1140   Total Treatment Time: 45     Medicare/BCBS Time Tracking (below)   Total Timed Codes (min):  45 1:1 Treatment Time:  45     TREATMENT AREA =  Left knee pain [M25.562]    SUBJECTIVE  Pain Level (on 0 to 10 scale):  2 / 10   Medication Changes/New allergies or changes in medical history, any new surgeries or procedures?   no If yes, update Summary List   Subjective Functional Status/Changes:  []  No changes reported     Because of yoga yesterday my body hurts all over. She did some new things yesterday in class. Mostly muscle soreness. OBJECTIVE  Modalities Rationale:     decrease inflammation, decrease pain, and increase tissue extensibility to improve patient's ability to perform functional mobility activities with decrease c/o symptoms.    min [] Estim, type/location:                                      []  att     []  unatt     []  w/US     []  w/ice    []  w/heat    min []  Mechanical Traction: type/lbs                   []  pro   []  sup   []  int   []  cont    []  before manual    []  after manual    min []  Ultrasound, settings/location:      min []  Iontophoresis w/ dexamethasone, location:                                               []  take home patch       []  in clinic    min []  Ice     []  Heat    location/position:     min []  Vasopneumatic Device, press/temp:    If using vaso (only need to measure limb vaso being performed on)      pre-treatment girth :       post-treatment girth :       measured at (landmark location) :      min []  Other:    [x] Skin assessment post-treatment (if applicable):    [x]  intact    []  redness- no adverse reaction                  []redness - adverse reaction:      20 min Therapeutic Exercise:  [x]  See flow sheet   Rationale:      increase ROM, increase strength, improve coordination, and improve balance to improve the patients ability to perform functional mobility activities with decrease c/o symptoms. 10 min Manual Therapy: PROM, HS stretch, STM   Rationale:      decrease pain, increase ROM, and increase tissue extensibility to improve patient's ability to perform functional mobility activities with decrease c/o symptoms. The manual therapy interventions were performed at a separate and distinct time from the therapeutic activities interventions. min Therapeutic Activity: [x]  See flow sheet   Rationale:    increase ROM, increase strength, improve coordination, and improve balance to improve the patients ability to perform functional mobility activities with decrease c/o symptoms. 15 min Neuromuscular Re-ed: [x]  See flow sheet   Rationale:    increase ROM, increase strength, improve coordination, and improve balance to improve the patients ability to perform functional mobility activities with decrease c/o symptoms     min Self Care:    Rationale:    increase ROM, increase strength, and improve coordination to improve the patients ability to perform functional mobility activities with decrease c/o symptoms. Billed With/As:   [x] TE   [] TA   [] Neuro   [] Self Care Patient Education: [x] Review HEP    [] Progressed/Changed HEP based on:   [] positioning   [] body mechanics   [] transfers   [] heat/ice application    [] other:      Other Objective/Functional Measures:    AROM parker extension: 0 degrees. Post Treatment Pain Level (on 0 to 10) scale:   0  / 10     ASSESSMENT  Assessment/Changes in Function:     Patient reporting significant improvement with all functional mobility activities and reduction of symptoms. Patient performed SLS on foam x 10 seconds with slight unsteadiness.      []  See Progress Note/Recertification   Patient will continue to benefit from skilled PT services to modify and progress therapeutic interventions, address functional mobility deficits, address ROM deficits, address strength deficits, analyze and address soft tissue restrictions, and analyze and cue movement patterns to attain remaining goals. Progress toward goals / Updated goals:    Short Term Goals: To be accomplished in  2  weeks:  1) Establish HEP to prevent further disability. Achieved 1/26/23  2) Patient will report decreased c/o pain to < or = 2/10 to facilitate return to walking program with manageable sx in L knee. Progressing well 1/26/23  3) Establish baseline FOTO to determine current level of function. 1/26/23 Achieved  4) Patient will be able to maintain L SLS for 10 seconds indicating improved use of  balance strategy for safety with ambulation in challenging environments. Progressing well 1/26/23  Long Term Goals: To be accomplished in  4  weeks:  1) Patient will be able to achieve full passive knee extension on L to normalize gait mechanics. 2) Patient to be independent & compliant with HEP in preparation for D/C.  3) Patient to be able to perform FWD step down from 4 inch step with good pelvis/knee stability & no c/o pain in preparation for return to rec stair negotiation.       PLAN  [x]  Upgrade activities as tolerated yes Continue plan of care   []  Discharge due to :    []  Other:      Therapist: April Ramirez PTA    Date: 1/26/2023 Time: 10:39 AM     Future Appointments   Date Time Provider Ayaka Aceves   1/26/2023 11:00 AM [facility] PT [place] 1 [facility]   1/31/2023 11:00 AM Luz Camara PT [facility]   2/2/2023 11:30 AM [facility] PT [place] 1 [facility]   2/7/2023 11:30 AM [facility] PT [place] 1 [facility]   2/9/2023 11:30 AM [facility] PT [place] 1 [facility]   2/14/2023 11:30 AM [facility] PT [place] 1 [facility]   2/16/2023 11[facility]30 AM [facility] PT [place] 1 [facility]

## 2023-01-31 ENCOUNTER — HOSPITAL ENCOUNTER (OUTPATIENT)
Dept: PHYSICAL THERAPY | Age: 73
Discharge: HOME | End: 2023-01-31
Payer: MEDICARE

## 2023-01-31 PROCEDURE — 97535 SELF CARE MNGMENT TRAINING: CPT

## 2023-01-31 PROCEDURE — 97110 THERAPEUTIC EXERCISES: CPT

## 2023-01-31 PROCEDURE — 97530 THERAPEUTIC ACTIVITIES: CPT

## 2023-01-31 NOTE — PROGRESS NOTES
PHYSICAL THERAPY - DAILY TREATMENT NOTE    Patient Name: Mamadou Garcia        Date: 2023  : 1950   YES Patient  Verified  Visit #:     Insurance: Payor: [place] MEDICARE / Plan: VA MEDICARE PART A & B / Product Type: Medicare /      In time: 11:00 A Out time: 11:55 A   Total Treatment Time: 50     BCBS/Medicare Time Tracking (below)   Total Timed Codes (min):  50 1:1 Treatment Time:  50     TREATMENT AREA =  Left knee pain [M25.562]  SUBJECTIVE  Pain Level (on 0 to 10 scale):  2  / 10   Medication Changes/New allergies or changes in medical history, any new surgeries or procedures? NO    If yes, update Summary List   Subjective Functional Status/Changes:  []  No changes reported     Patient reports her L knee has been doing well, she has more pain in her L hip & lower back, she is still doing yoga.            Modalities Rationale:      Patient deferred   min [] Estim, type/location:                                      []  att     []  unatt     []  w/US     []  w/ice    []  w/heat    min []  Mechanical Traction: type/lbs                   []  pro   []  sup   []  int   []  cont    []  before manual    []  after manual    min []  Ultrasound, settings/location:      min []  Iontophoresis w/ dexamethasone, location:                                               []  take home patch       []  in clinic    min []  Ice     []  Heat    location/position:     min []  Vasopneumatic Device, press/temp:    If using vaso (only need to measure limb vaso being performed on)      pre-treatment girth :       post-treatment girth :       measured at (landmark location) :      min []  Other:    [] Skin assessment post-treatment (if applicable):    []  intact    []  redness- no adverse reaction                  []redness - adverse reaction:        10 min Therapeutic Exercise:  [x]  See flow sheet   Rationale:      increase ROM and increase strength to improve the patients ability to return to pain-free standing 25 min Therapeutic Activity: [x]  See flow sheet  Reviewed various yoga postures in kneeling, standing, SLS & quadraped to prevent increase in LBP & L hip pain   Rationale:    increase ROM and increase strength to improve the patients ability to return to pain-free workout program     15 min Self Care: Reviewed progressive HEP & modifications to prevent worsening of L hip & LBP   Rationale:    increase ROM and increase strength to improve the patients ability to return to pain-free walking program         Billed With/As:   [] TE   [] TA   [] Neuro   [] Self Care Patient Education: [x] Review HEP    [] Progressed/Changed HEP based on:   [] positioning   [] body mechanics   [] transfers   [] heat/ice application    [] other:      Other Objective/Functional Measures:    Overall strength L knee 5/5 no c/o pain upon MMT  AROM: full extension      Post Treatment Pain Level (on 0 to 10) scale:   0  / 10     ASSESSMENT  Assessment/Changes in Function:     Good understanding of modifications to yoga practice to decrease L hip/lower back pain & improve gait mechanics     []  See Progress Note/Recertification   Patient will continue to benefit from skilled PT services to modify and progress therapeutic interventions, address functional mobility deficits, address ROM deficits, address strength deficits, analyze and address soft tissue restrictions, analyze and cue movement patterns, analyze and modify body mechanics/ergonomics, assess and modify postural abnormalities, address imbalance/dizziness, and instruct in home and community integration to attain remaining goals.    Progress toward goals / Updated goals:    Progressing towards STG 2 & STG 4 met      PLAN  []  Upgrade activities as tolerated YES Continue plan of care   []  Discharge due to :    []  Other: Progress note & update HEP n/v      Therapist: Faheem Jain PT    Date: 1/31/2023 Time: 11:23 AM     Future Appointments   Date Time Provider Department Center   2/2/2023 11:30 AM Naeem [facility]ah [facility]   2/7/2023 11:30 AM Aidan Bailey, PTA [facility]   2/9/2023 11:30 AM Aidan Bailey PTA [facility]   2/14/2023 11:30 AM [facility] PT REDMILL 1 [facility]   2/16/2023 11:30 AM [facility] PT REDMILL 1 [facility]

## 2023-02-02 ENCOUNTER — HOSPITAL ENCOUNTER (OUTPATIENT)
Dept: PHYSICAL THERAPY | Age: 73
Discharge: HOME | End: 2023-02-02
Payer: MEDICARE

## 2023-02-02 PROCEDURE — 97530 THERAPEUTIC ACTIVITIES: CPT

## 2023-02-02 PROCEDURE — 97535 SELF CARE MNGMENT TRAINING: CPT

## 2023-02-02 PROCEDURE — 97110 THERAPEUTIC EXERCISES: CPT

## 2023-02-02 NOTE — PROGRESS NOTES
PHYSICAL THERAPY - DAILY TREATMENT NOTE    Patient Name: Liberty Traore        Date: 2023  : 1950   YES Patient  Verified  Visit #:     Insurance: Payor: VA MEDICARE / Plan: VA MEDICARE PART A & B / Product Type: Medicare /      In time: 9:35 A Out time: 10:25 A   Total Treatment Time: 45     BCBS/Medicare Time Tracking (below)   Total Timed Codes (min):  45 1:1 Treatment Time:  45     TREATMENT AREA =  Left knee pain [M25.562]  SUBJECTIVE  Pain Level (on 0 to 10 scale):  2  / 10   Medication Changes/New allergies or changes in medical history, any new surgeries or procedures?     NO    If yes, update Summary List   Subjective Functional Status/Changes:  []  No changes reported     See progress note to MD     Patient reports pain level at 2-3/10, 3/10 at worst, 0/10 at best.            Modalities Rationale:     Patient deferred   min [] Estim, type/location:                                      []  att     []  unatt     []  w/US     []  w/ice    []  w/heat    min []  Mechanical Traction: type/lbs                   []  pro   []  sup   []  int   []  cont    []  before manual    []  after manual    min []  Ultrasound, settings/location:      min []  Iontophoresis w/ dexamethasone, location:                                               []  take home patch       []  in clinic    min []  Ice     []  Heat    location/position:     min []  Vasopneumatic Device, press/temp:    If using vaso (only need to measure limb vaso being performed on)      pre-treatment girth :       post-treatment girth :       measured at (landmark location) :      min []  Other:    [] Skin assessment post-treatment (if applicable):    []  intact    []  redness- no adverse reaction                  []redness - adverse reaction:        10 min Therapeutic Exercise:  [x]  See flow sheet   Rationale:      increase ROM and increase strength to improve the patients ability to return to PLOF     15 min Therapeutic Activity: [x] See flow sheet   Rationale:    increase ROM and increase strength to improve the patients ability to return to yoga without increase in LBP & L hip pain    20 min Self Care: Reviewed modifications to ADLs, gait & yoga fitness program to prevent worsening of gait mechanics & LBP & L hip pain & prep for upcoming surgery    Rationale:    increase ROM, improve coordination, improve balance, and increase proprioception to improve the patients ability to return to pain-free ADLs       Billed With/As:   [] TE   [] TA   [] Neuro   [] Self Care Patient Education: [x] Review HEP    [] Progressed/Changed HEP based on:   [] positioning   [] body mechanics   [] transfers   [] heat/ice application    [] other:      Other Objective/Functional Measures:    FOTO 58 points      Post Treatment Pain Level (on 0 to 10) scale:   2 / 10     ASSESSMENT  Assessment/Changes in Function:     Good understanding of current treatment & surgical preparations      []  See Progress Note/Recertification   Patient will continue to benefit from skilled PT services to modify and progress therapeutic interventions, address functional mobility deficits, address ROM deficits, address strength deficits, analyze and address soft tissue restrictions, analyze and cue movement patterns, analyze and modify body mechanics/ergonomics, assess and modify postural abnormalities, address imbalance/dizziness, and instruct in home and community integration to attain remaining goals.    Progress toward goals / Updated goals:    See progress note for progress with goals      PLAN  []  Upgrade activities as tolerated YES Continue plan of care   []  Discharge due to :    [x]  Other:      Therapist: Elidia Browning PT    Date: 2/2/2023 Time: 9:41 AM     Future Appointments   Date Time Provider Ayaka Aceves   2/7/2023 11:30 AM Danie Loredo PTA [facility]   2/9/2023 11:30 AM Danie Loredo PTA [facility]   2/14/2023 11:30 AM [facility] PT STEVE 1 [facility] 2/16/2023 11:30 AM [facility] PT REDMILL 1 [facility]

## 2023-02-02 NOTE — PROGRESS NOTES
[address] [place] [address] PHYSICAL THERAPY AT [address] [place] [address] [facility], [address] [place] [address], [address] [facility] [address]  Phone: [phone]  Fax: [phone]  PROGRESS NOTE  Patient Name: Gianluca Mitchell : 1950   Treatment/Medical Diagnosis: Left knee pain [M25.562]   Referral Source: Haris Rascon MD     Date of Initial Visit: 23 Attended Visits: 7 Missed Visits: 0     SUMMARY OF TREATMENT  Therapeutic exercise including ROM, stretching, gentle strengthening, stabilization training, postural ed, patient education, HEP instruction, manual therapy, gait training. CURRENT STATUS  Mrs. Jennifer Deras has made good progress with PT, c/o L knee pain are now intermittent in nature. She continues to demonstrate some signs of antagia with ambulation. Emphasis these last 2 treatments on improving posture & gait mechanics given increase in L hip & LBP. Overall strength at 5/5 with no c/o pain upon MMT. Discussed possible DC to HEP in the next 2 weeks. Goal/Measure of Progress Goal Met? 1.  Establish baseline FOTO to determine current level of function. Status at last Eval: Baseline FOTO 51  Current Status: 58 points yes   2. Patient will report decreased c/o pain to < or = 2/10 to facilitate return to walking program with manageable sx in L knee. Status at last Eval: Average 2-3/10  At worst 4/10  At best 2/10 Current Status: Average 2-310  At worst 3/10  At best 0/10 Progressing    3. Patient will be able to maintain L SLS for 10 seconds indicating improved use of  balance strategy for safety with ambulation in challenging environments. Status at last Eval: unable Current Status: Good stability with L SLS 10 secs yes   4. Patient will be able to achieve full passive knee extension on L to normalize gait mechanics. Status at last Eval: 2 degrees lacking full extension Current Status: Full passive extension yes     New Goals to be achieved in __3-4__  treatments:  1.   Patient to be independent & compliant with HEP in preparation for D/C & for upcoming surgery. RECOMMENDATIONS  Patient to be seen for up to 3-4 more weeks (prn) in order to progress towards DC in preparation for upcoming L TKR on 3-01-23. If you have any questions/comments please contact us directly at [phone]. Thank you for allowing us to assist in the care of your patient. Therapist Signature: KATIE Do, cert MDT Date: 1/2/0402   Certification Period:  Reporting Period: 2-02-23 to 4-28-23 1-12-23 to 2-02-23 Time: 1:19 PM     NOTE TO PHYSICIAN:  PLEASE COMPLETE THE ORDERS BELOW AND FAX TO   [facility] Physical Therapy: [phone]. If you are unable to process this request in 24 hours please contact our office: [phone].    ___ I have read the above report and request that my patient continue as recommended.   ___ I have read the above report and request that my patient continue therapy with the following changes/special instructions:_________________________________________________________   ___ I have read the above report and request that my patient be discharged from therapy.      Physician Signature:                                                             Date:                                     Time:                                                                       Lu Vasquez MD

## 2023-02-07 ENCOUNTER — HOSPITAL ENCOUNTER (OUTPATIENT)
Dept: PHYSICAL THERAPY | Age: 73
Discharge: HOME | End: 2023-02-07
Payer: MEDICARE

## 2023-02-07 PROCEDURE — 97535 SELF CARE MNGMENT TRAINING: CPT

## 2023-02-07 PROCEDURE — 97110 THERAPEUTIC EXERCISES: CPT

## 2023-02-07 NOTE — PROGRESS NOTES
PHYSICAL THERAPY - DAILY TREATMENT NOTE    Patient Name: Nkechi Garner        Date: 2023  : 1950   YES Patient  Verified  Visit #:    Insurance: Payor: Grady Rowland / Plan: VA MEDICARE PART A & B / Product Type: Medicare /      In time: 61 Out time: 1200   Total Treatment Time: 30     BCBS/Medicare Time Tracking (below)   Total Timed Codes (min):  30 1:1 Treatment Time:  30     TREATMENT AREA =  Left knee pain [M25.562]  SUBJECTIVE  Pain Level (on 0 to 10 scale): 1 / 10   Medication Changes/New allergies or changes in medical history, any new surgeries or procedures? NO    If yes, update Summary List   Subjective Functional Status/Changes:  []  No changes reported     Pain isn't really enough to mention.           Modalities Rationale:     Patient deferred   min [] Estim, type/location:                                      []  att     []  unatt     []  w/US     []  w/ice    []  w/heat    min []  Mechanical Traction: type/lbs                   []  pro   []  sup   []  int   []  cont    []  before manual    []  after manual    min []  Ultrasound, settings/location:      min []  Iontophoresis w/ dexamethasone, location:                                               []  take home patch       []  in clinic    min []  Ice     []  Heat    location/position:     min []  Vasopneumatic Device, press/temp:    If using vaso (only need to measure limb vaso being performed on)      pre-treatment girth :       post-treatment girth :       measured at (landmark location) :      min []  Other:    [] Skin assessment post-treatment (if applicable):    []  intact    []  redness- no adverse reaction                  []redness - adverse reaction:        15 min Therapeutic Exercise:  [x]  See flow sheet   Rationale:      increase ROM and increase strength to improve the patients ability to return to PLOF      min Therapeutic Activity: [x]  See flow sheet   Rationale:    increase ROM and increase strength to improve the patients ability to return to yoga without increase in LBP & L hip pain    15 min Self Care: Reviewed modifications for therx and yoga, prep for upcoming surgery and restrictions. Rationale:    increase ROM, improve coordination, improve balance, and increase proprioception to improve the patients ability to return to pain-free ADLs       Billed With/As:   [] TE   [] TA   [] Neuro   [] Self Care Patient Education: [x] Review HEP    [] Progressed/Changed HEP based on:   [] positioning   [] body mechanics   [] transfers   [] heat/ice application    [] other:      Other Objective/Functional Measures:    See DC note     Post Treatment Pain Level (on 0 to 10) scale:  2  / 10     ASSESSMENT  Assessment/Changes in Function:     See DC note     []  See Progress Note/Recertification      Progress toward goals / Updated goals:    New Goals to be achieved in __3-4__  treatments:  1. Patient to be independent & compliant with HEP in preparation for D/C & for upcoming surgery. Achieved.         PLAN  []  Upgrade activities as tolerated no Continue plan of care   [x]  Discharge due to : Program completed   []  Other:      Therapist: Mar Lane PTA    Date: 2/7/2023 Time: 9:41 AM     Future Appointments   Date Time Provider Ayaka Aceves   2/7/2023 11:30 AM Beth Marrero PTA [facility]   2/9/2023 11:30 AM Beth Marrero PTA [facility]   2/14/2023 11:30 AM [facility] PT REDMILL 1 [facility]   2/16/2023 11:30 AM [facility] PT REDMILL 1 [facility]

## 2023-02-07 NOTE — THERAPY DISCHARGE
[address] [place] [address] PHYSICAL THERAPY AT [address] [place] [address]  Holland Saleh [facility] 77, [address] [place] [address], [address] [facility]  Phone: [phone]  Fax: [phone] FOR PHYSICAL THERAPY          Patient Name: Ricarda Dalton : 1950   Treatment/Medical Diagnosis: Left knee pain [M25.562]   Onset Date: chronic    Referral Source: Bobbi Damon MD [facility]): 2023   Prior Hospitalization: See Medical History Provider #: [id number]   Prior Level of Function: Limited with prolonged walking    Comorbidities: H/o LBP & L hip pain & R knee pain   Medications: Verified on Patient Summary List   Visits from [facility]: 8 Missed Visits: 7     Goal/Measure of Progress Goal Met? 1. Patient to be independent & compliant with HEP in preparation for D/C & for upcoming surgery. Status at last Eval:  Current Status:  yes     Key Functional Changes/Progress: Mrs. Lonie Severin has made good progress with PT, c/o L knee pain are now intermittent in nature. She continues to demonstrate some signs of antagia with ambulation. Emphasis these last 2 treatments on improving posture & gait mechanics given increase in L hip & LBP. Overall strength at 5/5 with no c/o pain upon MMT. Assessments/Recommendations: Discontinue therapy. Progressing towards or have reached established goals. If you have any questions/comments please contact us directly at [phone]. Thank you for allowing us to assist in the care of your patient.     Therapist Signature: Khushboo Hannon PTA Date: 23   Reporting Period: 23 - 23 Time: 1:36 PM

## 2023-02-09 ENCOUNTER — APPOINTMENT (OUTPATIENT)
Dept: PHYSICAL THERAPY | Age: 73
End: 2023-02-09
Payer: MEDICARE

## 2023-02-14 ENCOUNTER — APPOINTMENT (OUTPATIENT)
Dept: PHYSICAL THERAPY | Age: 73
End: 2023-02-14
Payer: MEDICARE

## 2023-02-16 ENCOUNTER — APPOINTMENT (OUTPATIENT)
Dept: PHYSICAL THERAPY | Age: 73
End: 2023-02-16
Payer: MEDICARE

## 2024-04-08 ENCOUNTER — HOSPITAL ENCOUNTER (OUTPATIENT)
Facility: HOSPITAL | Age: 74
Setting detail: RECURRING SERIES
Discharge: HOME | End: 2024-04-11
Payer: MEDICARE

## 2024-04-08 PROCEDURE — 97162 PT EVAL MOD COMPLEX 30 MIN: CPT | Performed by: PHYSICAL THERAPIST

## 2024-04-08 PROCEDURE — 97110 THERAPEUTIC EXERCISES: CPT | Performed by: PHYSICAL THERAPIST

## 2024-04-08 NOTE — PROGRESS NOTES
Gait:  [x] Normal     [] Abnormal:    Active Movements: [] N/A   [] Too acute   [] Other:  ROM % AROM % PROM Comments:pain, area   Forward flexion 40-60 full  Painful, no reversal of curve   Extension 20-30 50%     SB right 20-30 full  R flank pain   SB left 20-30 75%  R flank /Sij area    Rotation right 5-10 WNL     Rotation left 5-10 WNL     Ellyn knee AROM: WNL  R hip AROM flexion 100deg, L: 130deg, Hip ext: L: 10 deg, R: 15 deg   Repeated Movements : JARED: mild increase in R QL area  Neuro Screen: light touch: intact   SLR:  Supine: [] R    [] L    [] +    [x] -  @ (degrees):   Palpation: mod TTP in R QL area  Stabilization Tests: Bridge: full   Strength   L(0-5) R (0-5) N/T   Hip Flexion (L1,2) 4+ 4 []   Knee Extension (L3,4) 4 4 []   Ankle Dorsiflexion (L4) 4+ 4+ []   Ankle Plantarflexion (S1) 4 4 []   Knee Flexion (S1,2) 5 5 []   Gluteus José Antonio 4- 4- []   Hip ABD 4 4 []   Decreased eccentric strength Ellyn  Lumbar: extension increased R flank pain along with R SB  R LE shorter in supine: possibly due to R QL tightness.   Deficits:           Piriformis: Tight Ellyn    Jasper: Unable to test    Hamstrings 90/90: negative    Gastrocsoleus (to neutral):  Right: 6deg              Left: 4deg                          Afua: + ellyn  Balance: tandem: 30 sec Ellyn, ROM: Foam: 30 sec EO, ROM foam EC: 3 sec, SLS: ~15sec Ellyn  Patient scored 54 on FOTO indicating decreased functional activity level and QOL. A home exercise program was demonstrated and provided to address the above objective and functional deficits. Patient can benefit from PT interventions to improve deficits listed above and to facilitate ADLs & overall functional status.    Evaluation Complexity:  History:  MEDIUM  Complexity : 1-2 comorbidities / personal factors will impact the outcome/ POC ; Examination:  MEDIUM Complexity : 3 Standardized tests and measures addressin body structure, function, activity limitation and / or participation in recreation  
pain along with R SB  R LE shorter in supine: possibly due to R QL tightness.   Deficits:           Piriformis: Tight Ellyn    Jasper: Unable to test    Hamstrings 90/90: negative    Gastrocsoleus (to neutral):  Right: 6deg              Left: 4deg                          Afua: + ellyn  Balance: tandem: 30 sec Ellyn, ROM: Foam: 30 sec EO, ROM foam EC: 3 sec, SLS: ~15sec Ellyn    Patient will continue to benefit from skilled PT / OT services to modify and progress therapeutic interventions, analyze and address functional mobility deficits, analyze and address ROM deficits, analyze and address strength deficits, analyze and address soft tissue restrictions, analyze and cue for proper movement patterns, analyze and modify for postural abnormalities, and analyze and address imbalance/dizziness to address functional deficits and attain remaining goals.    Progress toward goals / Updated goals:  [x]  See POC    PLAN  yes Continue plan of care  []  Upgrade activities as tolerated  []  Discharge due to :  [x]  Other: Patient to be seen 2/wk for 36 treatments.     Yasmeen Machado, PT    4/8/2024    11:24 AM    Future Appointments   Date Time Provider Department Center   4/8/2024 11:40 AM Arley Reyes, PT [facility]

## 2024-04-11 ENCOUNTER — HOSPITAL ENCOUNTER (OUTPATIENT)
Facility: HOSPITAL | Age: 74
Setting detail: RECURRING SERIES
Discharge: HOME | End: 2024-04-14
Payer: MEDICARE

## 2024-04-11 PROCEDURE — 97110 THERAPEUTIC EXERCISES: CPT

## 2024-04-11 PROCEDURE — 97112 NEUROMUSCULAR REEDUCATION: CPT

## 2024-04-11 PROCEDURE — 97530 THERAPEUTIC ACTIVITIES: CPT

## 2024-04-11 NOTE — PROGRESS NOTES
PHYSICAL / OCCUPATIONAL THERAPY - DAILY TREATMENT NOTE    Patient Name: Anay Baird    Date: 2024    : 1950  Insurance: Payor: MEDICARE / Plan: MEDICARE PART A AND B / Product Type: *No Product type* /      Patient  verified Yes     Visit #   Current / Total 2 36   Time   In / Out 9:05 am 9:59 pm   Pain   In / Out 4 3   Subjective Functional Status/Changes: Patient reports yoga it feels good but I sit on the couch ~3 hours it bad     TREATMENT AREA =  Other low back pain [M54.59]  Other abnormalities of gait and mobility [R26.89]    OBJECTIVE    Modalities Rationale:     decrease edema, decrease inflammation, decrease pain, and increase tissue extensibility to improve patient's ability to progress to PLOF and address remaining functional goals.     min [] Estim Unattended, type/location:                                      []  w/ice    []  w/heat    min [] Estim Attended, type/location:                                     []  w/US     []  w/ice    []  w/heat    []  TENS insruct      min []  Mechanical Traction: type/lbs                   []  pro   []  sup   []  int   []  cont    []  before manual    []  after manual    min []  Ultrasound, settings/location:     10 min  unbill [x]  Ice     []  Heat    location/position: Supine low back     min []  Paraffin,  details:     min []  Vasopneumatic Device, press/temp:     min []  Whirlpool / Fluido:    If using vaso (only need to measure limb vaso being performed on)      pre-treatment girth :       post-treatment girth :       measured at (landmark location) :      min []  Other:    Skin assessment post-treatment:   Intact      Therapeutic Procedures:    Tx Min Billable or 1:1 Min (if diff from Tx Min) Procedure, Rationale, Specifics   15  [id number] Therapeutic Exercise (timed):  increase ROM, strength, coordination, balance, and proprioception to improve patient's ability to progress to PLOF and address remaining functional goals. (see flow sheet as

## 2024-04-15 ENCOUNTER — HOSPITAL ENCOUNTER (OUTPATIENT)
Facility: HOSPITAL | Age: 74
Setting detail: RECURRING SERIES
Discharge: HOME | End: 2024-04-18
Payer: MEDICARE

## 2024-04-15 PROCEDURE — 97112 NEUROMUSCULAR REEDUCATION: CPT | Performed by: PHYSICAL THERAPIST

## 2024-04-15 PROCEDURE — 97530 THERAPEUTIC ACTIVITIES: CPT | Performed by: PHYSICAL THERAPIST

## 2024-04-15 PROCEDURE — 97110 THERAPEUTIC EXERCISES: CPT | Performed by: PHYSICAL THERAPIST

## 2024-04-15 NOTE — PROGRESS NOTES
PHYSICAL / OCCUPATIONAL THERAPY - DAILY TREATMENT NOTE    Patient Name: Anay Baird    Date: 4/15/2024    : 1950  Insurance: Payor: MEDICARE / Plan: MEDICARE PART A AND B / Product Type: *No Product type* /      Patient  verified Yes     Visit #   Current / Total 3 36   Time   In / Out 10:30 am 1127am   Pain   In / Out 2 1-2   Subjective Functional Status/Changes: Patient reports no significant mm soreness post session.     TREATMENT AREA =  Other low back pain [M54.59]  Other abnormalities of gait and mobility [R26.89]    OBJECTIVE    Modalities Rationale:     decrease edema, decrease inflammation, decrease pain, and increase tissue extensibility to improve patient's ability to progress to PLOF and address remaining functional goals.     min [] Estim Unattended, type/location:                                      []  w/ice    []  w/heat    min [] Estim Attended, type/location:                                     []  w/US     []  w/ice    []  w/heat    []  TENS insruct      min []  Mechanical Traction: type/lbs                   []  pro   []  sup   []  int   []  cont    []  before manual    []  after manual    min []  Ultrasound, settings/location:     10 min  unbill [x]  Ice     []  Heat    location/position: Supine low back , and L hip    min []  Paraffin,  details:     min []  Vasopneumatic Device, press/temp:     min []  Whirlpool / Fluido:    If using vaso (only need to measure limb vaso being performed on)      pre-treatment girth :       post-treatment girth :       measured at (landmark location) :      min []  Other:    Skin assessment post-treatment:   Intact      Therapeutic Procedures:    Tx Min Billable or 1:1 Min (if diff from Tx Min) Procedure, Rationale, Specifics   20 73 [id number] Therapeutic Exercise (timed):  increase ROM, strength, coordination, balance, and proprioception to improve patient's ability to progress to PLOF and address remaining functional goals. (see flow sheet as

## 2024-04-18 ENCOUNTER — HOSPITAL ENCOUNTER (OUTPATIENT)
Facility: HOSPITAL | Age: 74
Setting detail: RECURRING SERIES
Discharge: HOME | End: 2024-04-21
Payer: MEDICARE

## 2024-04-18 PROCEDURE — G0283 ELEC STIM OTHER THAN WOUND: HCPCS

## 2024-04-18 PROCEDURE — 97110 THERAPEUTIC EXERCISES: CPT

## 2024-04-18 PROCEDURE — 97530 THERAPEUTIC ACTIVITIES: CPT

## 2024-04-18 NOTE — PROGRESS NOTES
PHYSICAL / OCCUPATIONAL THERAPY - DAILY TREATMENT NOTE    Patient Name: Anay Baird    Date: 2024    : 1950  Insurance: Payor: MEDICARE / Plan: MEDICARE PART A AND B / Product Type: *No Product type* /      Patient  verified Yes     Visit #   Current / Total 4 36   Time   In / Out 9:00 am 9:58 am   Pain   In / Out 1 0   Subjective Functional Status/Changes: Pt reports pain varies day to day. Clams are difficulty. Pain mid low back R> L.      TREATMENT AREA =  Other low back pain [M54.59]  Other abnormalities of gait and mobility [R26.89]    OBJECTIVE    Modalities Rationale:     decrease edema, decrease inflammation, decrease pain, and increase tissue extensibility to improve patient's ability to progress to PLOF and address remaining functional goals.    10 min [x] Estim Unattended, type/location: IFC low back supine                                     [x]  w/ice    []  w/heat    min [] Estim Attended, type/location:                                     []  w/US     []  w/ice    []  w/heat    []  TENS insruct      min []  Mechanical Traction: type/lbs                   []  pro   []  sup   []  int   []  cont    []  before manual    []  after manual    min []  Ultrasound, settings/location:      min  unbill []  Ice     []  Heat    location/position:     min []  Paraffin,  details:     min []  Vasopneumatic Device, press/temp:     min []  Whirlpool / Fluido:    If using vaso (only need to measure limb vaso being performed on)      pre-treatment girth :       post-treatment girth :       measured at (landmark location) :      min []  Other:    Skin assessment post-treatment:   Intact      Therapeutic Procedures:    Tx Min Billable or 1:1 Min (if diff from Tx Min) Procedure, Rationale, Specifics          Details if applicable:        11162 Therapeutic Exercise (timed):  increase ROM, strength, coordination, balance, and proprioception to improve patient's ability to progress to PLOF and address

## 2024-04-22 ENCOUNTER — APPOINTMENT (OUTPATIENT)
Facility: HOSPITAL | Age: 74
End: 2024-04-22
Payer: MEDICARE

## 2024-04-25 ENCOUNTER — HOSPITAL ENCOUNTER (OUTPATIENT)
Facility: HOSPITAL | Age: 74
Setting detail: RECURRING SERIES
Discharge: HOME | End: 2024-04-28
Payer: MEDICARE

## 2024-04-25 PROCEDURE — 97112 NEUROMUSCULAR REEDUCATION: CPT

## 2024-04-25 PROCEDURE — 97110 THERAPEUTIC EXERCISES: CPT

## 2024-04-25 PROCEDURE — 97530 THERAPEUTIC ACTIVITIES: CPT

## 2024-04-25 NOTE — PROGRESS NOTES
PHYSICAL / OCCUPATIONAL THERAPY - DAILY TREATMENT NOTE    Patient Name: Anay Baird    Date: 2024    : 1950  Insurance: Payor: MEDICARE / Plan: MEDICARE PART A AND B / Product Type: *No Product type* /      Patient  verified Yes     Visit #   Current / Total 5 36   Time   In / Out 9:00 am 9:48 am   Pain   In / Out 2 0   Subjective Functional Status/Changes: Pt reports cramping in right hip with clams-held in HEP. Difficulty ascending stairs secondary to left knee pain.      TREATMENT AREA =  Other low back pain [M54.59]    OBJECTIVE         Therapeutic Procedures:    Tx Min Billable or 1:1 Min (if diff from Tx Min) Procedure, Rationale, Specifics   20  94331 Therapeutic Exercise (timed):  increase ROM, strength, coordination, balance, and proprioception to improve patient's ability to progress to PLOF and address remaining functional goals. (see flow sheet as applicable)     Details if applicable:       16  04364 Neuromuscular Re-Education (timed):  improve balance, coordination, kinesthetic sense, posture, core stability and proprioception to improve patient's ability to develop conscious control of individual muscles and awareness of position of extremities in order to progress to PLOF and address remaining functional goals. (see flow sheet as applicable)     Details if applicable:  corrected right anterior innominate with MET; heel lift trial   12  98105 Therapeutic Activity (timed):  use of dynamic activities replicating functional movements to increase ROM, strength, coordination, balance, and proprioception in order to improve patient's ability to progress to PLOF and address remaining functional goals.  (see flow sheet as applicable)     Details if applicable:            Details if applicable:            Details if applicable:     48  [facility] Totals Reminder: bill using total billable min of TIMED therapeutic procedures (example: do not include dry needle or estim unattended, both untimed

## 2024-04-29 ENCOUNTER — HOSPITAL ENCOUNTER (OUTPATIENT)
Facility: HOSPITAL | Age: 74
Setting detail: RECURRING SERIES
Discharge: HOME | End: 2024-05-02
Payer: MEDICARE

## 2024-04-29 PROCEDURE — 97140 MANUAL THERAPY 1/> REGIONS: CPT

## 2024-04-29 PROCEDURE — 97530 THERAPEUTIC ACTIVITIES: CPT

## 2024-04-29 PROCEDURE — 97110 THERAPEUTIC EXERCISES: CPT

## 2024-04-29 NOTE — PROGRESS NOTES
PHYSICAL / OCCUPATIONAL THERAPY - DAILY TREATMENT NOTE    Patient Name: Anay Baird    Date: 2024    : 1950  Insurance: Payor: MEDICARE / Plan: MEDICARE PART A AND B / Product Type: *No Product type* /      Patient  verified Yes     Visit #   Current / Total 6 36   Time   In / Out 9:00 am 9:56    Pain   In / Out 2 0   Subjective Functional Status/Changes: Pt states Thursday she went grocery shopping and wore heel lift. Lots of low back pain to 6/10. Worse some since then. She has not been walking for an hour.      TREATMENT AREA =  Other low back pain [M54.59]    OBJECTIVE    Modalities Rationale:     decrease edema, decrease inflammation, decrease pain, and increase tissue extensibility to improve patient's ability to progress to PLOF and address remaining functional goals.     min [] Estim Unattended, type/location:                                      []  w/ice    []  w/heat    min [] Estim Attended, type/location:                                     []  w/US     []  w/ice    []  w/heat    []  TENS insruct      min []  Mechanical Traction: type/lbs                   []  pro   []  sup   []  int   []  cont    []  before manual    []  after manual    min []  Ultrasound, settings/location:     10 min  unbill [x]  Ice     []  Heat    location/position: Prone low back     min []  Paraffin,  details:     min []  Vasopneumatic Device, press/temp:     min []  Whirlpool / Fluido:    If using vaso (only need to measure limb vaso being performed on)      pre-treatment girth :       post-treatment girth :       measured at (landmark location) :      min []  Other:    Skin assessment post-treatment:   Intact      Therapeutic Procedures:    Tx Min Billable or 1:1 Min (if diff from Tx Min) Procedure, Rationale, Specifics   20  02453 Therapeutic Exercise (timed):  increase ROM, strength, coordination, balance, and proprioception to improve patient's ability to progress to PLOF and address remaining functional

## 2024-05-02 ENCOUNTER — HOSPITAL ENCOUNTER (OUTPATIENT)
Facility: HOSPITAL | Age: 74
Setting detail: RECURRING SERIES
Discharge: HOME | End: 2024-05-05
Payer: MEDICARE

## 2024-05-02 PROCEDURE — 97140 MANUAL THERAPY 1/> REGIONS: CPT

## 2024-05-02 PROCEDURE — 97110 THERAPEUTIC EXERCISES: CPT

## 2024-05-02 NOTE — PROGRESS NOTES
PHYSICAL / OCCUPATIONAL THERAPY - DAILY TREATMENT NOTE    Patient Name: Anay Baird    Date: 2024    : 1950  Insurance: Payor: MEDICARE / Plan: MEDICARE PART A AND B / Product Type: *No Product type* /      Patient  verified Yes     Visit #   Current / Total 7 36   Time   In / Out 9:05 am 1000    Pain   In / Out 1 0-1   Subjective Functional Status/Changes: Pt reports lying on stomach eased low back pain.   Right knee locked when resting knee it on the edge of the table. Increased pain when she got it back it popped. Once it popped it fine.      TREATMENT AREA =  Other low back pain [M54.59]    OBJECTIVE    Modalities Rationale:     decrease edema, decrease inflammation, decrease pain, and increase tissue extensibility to improve patient's ability to progress to PLOF and address remaining functional goals.     min [] Estim Unattended, type/location:                                      []  w/ice    []  w/heat    min [] Estim Attended, type/location:                                     []  w/US     []  w/ice    []  w/heat    []  TENS insruct      min []  Mechanical Traction: type/lbs                   []  pro   []  sup   []  int   []  cont    []  before manual    []  after manual    min []  Ultrasound, settings/location:     10 min  unbill [x]  Ice     []  Heat    location/position: Supine low back     min []  Paraffin,  details:     min []  Vasopneumatic Device, press/temp:     min []  Whirlpool / Fluido:    If using vaso (only need to measure limb vaso being performed on)      pre-treatment girth :       post-treatment girth :       measured at (landmark location) :      min []  Other:    Skin assessment post-treatment:   Intact      Therapeutic Procedures:    Tx Min Billable or 1:1 Min (if diff from Tx Min) Procedure, Rationale, Specifics   20 10 03411 Therapeutic Exercise (timed):  increase ROM, strength, coordination, balance, and proprioception to improve patient's ability to progress to PLOF

## 2024-05-06 ENCOUNTER — HOSPITAL ENCOUNTER (OUTPATIENT)
Facility: HOSPITAL | Age: 74
Setting detail: RECURRING SERIES
Discharge: HOME | End: 2024-05-09
Payer: MEDICARE

## 2024-05-06 PROCEDURE — 97112 NEUROMUSCULAR REEDUCATION: CPT | Performed by: PHYSICAL THERAPIST

## 2024-05-06 PROCEDURE — 97530 THERAPEUTIC ACTIVITIES: CPT | Performed by: PHYSICAL THERAPIST

## 2024-05-06 PROCEDURE — 97110 THERAPEUTIC EXERCISES: CPT | Performed by: PHYSICAL THERAPIST

## 2024-05-06 NOTE — PROGRESS NOTES
PHYSICAL / OCCUPATIONAL THERAPY - DAILY TREATMENT NOTE    Patient Name: Anay Baird    Date: 2024    : 1950  Insurance: Payor: MEDICARE / Plan: MEDICARE PART A AND B / Product Type: *No Product type* /      Patient  verified Yes     Visit #   Current / Total 8 36   Time   In / Out 1020am 1110am   Pain   In / Out 2 0   Subjective Functional Status/Changes: See PN       TREATMENT AREA =  Other low back pain [M54.59]    OBJECTIVE    Modalities Rationale:     decrease edema, decrease inflammation, decrease pain, and increase tissue extensibility to improve patient's ability to progress to PLOF and address remaining functional goals.     min [] Estim Unattended, type/location:                                      []  w/ice    []  w/heat    min [] Estim Attended, type/location:                                     []  w/US     []  w/ice    []  w/heat    []  TENS insruct      min []  Mechanical Traction: type/lbs                   []  pro   []  sup   []  int   []  cont    []  before manual    []  after manual    min []  Ultrasound, settings/location:     10 min  unbill [x]  Ice     []  Heat    location/position: Supine low back     min []  Paraffin,  details:     min []  Vasopneumatic Device, press/temp:     min []  Whirlpool / Fluido:    If using vaso (only need to measure limb vaso being performed on)      pre-treatment girth :       post-treatment girth :       measured at (landmark location) :      min []  Other:    Skin assessment post-treatment:   Intact      Therapeutic Procedures:    Tx Min Billable or 1:1 Min (if diff from Tx Min) Procedure, Rationale, Specifics   15 15 62554 Therapeutic Exercise (timed):  increase ROM, strength, coordination, balance, and proprioception to improve patient's ability to progress to PLOF and address remaining functional goals. (see flow sheet as applicable)     Details if applicable:       10 10 80910 Neuromuscular Re-Education (timed):  improve balance, 
twisting in bed.  Patient goal: \" strength in my legs, be able to do yoga without cramping, back to stop hurting, do stairs better. \"  FOTO: 54/100 at IE. 58 5/6/24  Progress toward goals / Updated goals:  []  See Progress Note/Recertification  Short Term Goals: To be accomplished in 6 weeks   Patient to be Indep and compliant with initial HEP to address above listed deficits.  Status at IE established at IE, educated at first FU  Current 4/29/24 - goal in progress  2.   Patient to increase ROM EC on foam to 30 sec for improved stability/balance.   IE: 3 sec  Current: 5/2/24 EO ROM foam ~ 30 seconds    3.  Patient to present with negative Ely Damian indicating improved quad length and hip mobility with gait/transfers.   IE: + Damian  PN: + Damian R>L  4.  Patient to report pain no >than 3 to facilitate return to walking dog.   IE: 3-5  PN: 1-4/10  Long Term Goals: To be accomplished in 36 treatments  1. Patient to be independent & compliant with progressive HEP in preparation for D/C.   Status at IE   2.  Patient to increase FOTO score to 64 indicating improved functional abilities and QOL.   Status at IE 54  PN:58  3.  Patient to increase strength in B hip extension and ABD to 4+ to facilitate lumbo-pelvic stability.  Status at IE   Gluteus José Antonio 4- 4- []    Hip ABD 4 4 []    PN: Hip extension: 4+ Damian, Hip ABD: L: 4, R 4+   4.  Patient able to perform 5 reps 6 in step down with good eccentric control Damian to facilitate descending stairs.   Status at IE decreased eccentric control on L knee., pain on R: fair control  PN: pain and decreased control with 6 in tap down, able to perform 4inch with mod cues.      Payor: MEDICARE / Plan: MEDICARE PART A AND B / Product Type: *No Product type* /     Medicare, cannot change goals, cannot adjust frequency/duration, no signature required   Reporting Period: (date from last Prog Note/Eval to current Prog Note/Recert)  4/8/24 - 5/6/24    RECOMMENDATIONS  Patient would benefit from the

## 2024-05-08 ENCOUNTER — HOSPITAL ENCOUNTER (OUTPATIENT)
Facility: HOSPITAL | Age: 74
Setting detail: RECURRING SERIES
End: 2024-05-08
Payer: MEDICARE

## 2024-05-10 ENCOUNTER — HOSPITAL ENCOUNTER (OUTPATIENT)
Facility: HOSPITAL | Age: 74
Setting detail: RECURRING SERIES
Discharge: HOME | End: 2024-05-13
Payer: MEDICARE

## 2024-05-10 PROCEDURE — 97530 THERAPEUTIC ACTIVITIES: CPT | Performed by: PHYSICAL THERAPIST

## 2024-05-10 PROCEDURE — 97112 NEUROMUSCULAR REEDUCATION: CPT | Performed by: PHYSICAL THERAPIST

## 2024-05-10 PROCEDURE — 97110 THERAPEUTIC EXERCISES: CPT | Performed by: PHYSICAL THERAPIST

## 2024-05-10 NOTE — PROGRESS NOTES
hip ABD strength with decreasing from OTB to PTB due to recruiting QL mm excessively.   Added TRX squats due to mod pain and crepitus in R knee with STS, as well as knee valgus. Continued to strengthen hip ER for decreased knee valgus.   Pt given written Hep instructions today.      Patient will continue to benefit from skilled PT / OT services to modify and progress therapeutic interventions, analyze and address functional mobility deficits, analyze and address ROM deficits, analyze and address strength deficits, analyze and address soft tissue restrictions, analyze and cue for proper movement patterns, and instruct in home and community integration to address functional deficits and attain remaining goals.    GOALS:  Short Term Goals: To be accomplished in 6 weeks   Patient to be Indep and compliant with initial HEP to address above listed deficits.  Status at IE established at IE, educated at first FU  Current 4/29/24 - goal in progress  2.   Patient to increase ROM EC on foam to 30 sec for improved stability/balance.   IE: 3 sec  Current: 5/2/24 EO ROM foam ~ 30 seconds     3.  Patient to present with negative Ely Damian indicating improved quad length and hip mobility with gait/transfers.   IE: + Damian  PN: + Damian R>L  4.  Patient to report pain no >than 3 to facilitate return to walking dog.   IE: 3-5  PN: 1-4/10  Long Term Goals: To be accomplished in 36 treatments  1. Patient to be independent & compliant with progressive HEP in preparation for D/C.   Status at IE   2.  Patient to increase FOTO score to 64 indicating improved functional abilities and QOL.   Status at IE 54  PN:58  3.  Patient to increase strength in B hip extension and ABD to 4+ to facilitate lumbo-pelvic stability.  Status at IE   Gluteus José Antonio 4- 4- []    Hip ABD 4 4 []    PN: Hip extension: 4+ Damian, Hip ABD: L: 4, R 4+   4.  Patient able to perform 5 reps 6 in step down with good eccentric control Damian to facilitate descending stairs.   Status

## 2024-05-14 ENCOUNTER — HOSPITAL ENCOUNTER (OUTPATIENT)
Facility: HOSPITAL | Age: 74
Setting detail: RECURRING SERIES
Discharge: HOME | End: 2024-05-17
Payer: MEDICARE

## 2024-05-14 PROCEDURE — 97112 NEUROMUSCULAR REEDUCATION: CPT

## 2024-05-14 PROCEDURE — 97530 THERAPEUTIC ACTIVITIES: CPT

## 2024-05-14 PROCEDURE — 97110 THERAPEUTIC EXERCISES: CPT

## 2024-05-14 NOTE — PROGRESS NOTES
PHYSICAL / OCCUPATIONAL THERAPY - DAILY TREATMENT NOTE    Patient Name: Anay Baird    Date: 2024    : 1950  Insurance: Payor: MEDICARE / Plan: MEDICARE PART A AND B / Product Type: *No Product type* /      Patient  verified Yes     Visit #   Current / Total 10 36   Time   In / Out 10:26 am 11:20 am   Pain   In / Out 3.5 2   Subjective Functional Status/Changes: Pt reports everything is sore today.      TREATMENT AREA =  Other low back pain [M54.59]     OBJECTIVE    Modalities Rationale:     decrease edema, decrease inflammation, decrease pain, and increase tissue extensibility to improve patient's ability to progress to PLOF and address remaining functional goals.     min [] Estim Unattended, type/location:                                      []  w/ice    []  w/heat    min [] Estim Attended, type/location:                                     []  w/US     []  w/ice    []  w/heat    []  TENS insruct      min []  Mechanical Traction: type/lbs                   []  pro   []  sup   []  int   []  cont    []  before manual    []  after manual    min []  Ultrasound, settings/location:     10 min  unbill [x]  Ice     []  Heat    location/position: Supine low back     min []  Paraffin,  details:     min []  Vasopneumatic Device, press/temp:     min []  Whirlpool / Fluido:    If using vaso (only need to measure limb vaso being performed on)      pre-treatment girth :       post-treatment girth :       measured at (landmark location) :      min []  Other:    Skin assessment post-treatment:   Intact      Therapeutic Procedures:    Tx Min Billable or 1:1 Min (if diff from Tx Min) Procedure, Rationale, Specifics   24  20642 Therapeutic Exercise (timed):  increase ROM, strength, coordination, balance, and proprioception to improve patient's ability to progress to PLOF and address remaining functional goals. (see flow sheet as applicable)     Details if applicable:       12  53220 Neuromuscular Re-Education

## 2024-05-17 ENCOUNTER — HOSPITAL ENCOUNTER (OUTPATIENT)
Facility: HOSPITAL | Age: 74
Setting detail: RECURRING SERIES
Discharge: HOME | End: 2024-05-20
Payer: MEDICARE

## 2024-05-17 PROCEDURE — 97110 THERAPEUTIC EXERCISES: CPT | Performed by: PHYSICAL THERAPIST

## 2024-05-17 PROCEDURE — 97112 NEUROMUSCULAR REEDUCATION: CPT | Performed by: PHYSICAL THERAPIST

## 2024-05-17 PROCEDURE — 97530 THERAPEUTIC ACTIVITIES: CPT | Performed by: PHYSICAL THERAPIST

## 2024-05-17 NOTE — PROGRESS NOTES
mod cues    PN due 6/6/24  Ins aut: 12//31/24 med nec    PLAN  - Continue Plan of Care    Yasmeen Machado PT    5/17/2024    9:49 AM    Future Appointments   Date Time Provider Department Center   5/17/2024 11:00 AM Yasmeen Machado, PT [facility]   5/20/2024 11:00 AM Yasmeen Machado, PT [facility]   5/24/2024 11:00 AM Ted Holder PTA [facility]   5/29/2024 10:20 AM Yasmeen Machado, PT [facility]   5/31/2024 10:20 AM Yasmeen Machado, PT [facility]   6/3/2024  9:40 AM Ted Holder PTA [facility]   6/7/2024  9:40 AM Ted Holder PTA [facility]

## 2024-05-20 ENCOUNTER — HOSPITAL ENCOUNTER (OUTPATIENT)
Facility: HOSPITAL | Age: 74
Setting detail: RECURRING SERIES
Discharge: HOME | End: 2024-05-23
Payer: MEDICARE

## 2024-05-20 PROCEDURE — 97140 MANUAL THERAPY 1/> REGIONS: CPT | Performed by: PHYSICAL THERAPIST

## 2024-05-20 PROCEDURE — 97110 THERAPEUTIC EXERCISES: CPT | Performed by: PHYSICAL THERAPIST

## 2024-05-20 PROCEDURE — 97530 THERAPEUTIC ACTIVITIES: CPT | Performed by: PHYSICAL THERAPIST

## 2024-05-20 PROCEDURE — 97112 NEUROMUSCULAR REEDUCATION: CPT | Performed by: PHYSICAL THERAPIST

## 2024-05-20 NOTE — PROGRESS NOTES
PHYSICAL / OCCUPATIONAL THERAPY - DAILY TREATMENT NOTE    Patient Name: Anay Baird    Date: 2024    : 1950  Insurance: Payor: MEDICARE / Plan: MEDICARE PART A AND B / Product Type: *No Product type* /      Patient  verified Yes     Visit #   Current / Total 12 36   Time   In / Out 1100am 1218   Pain   In / Out 2 1-2   Subjective Functional Status/Changes: Pt reports she over exercised and did too much Wed and was really sore the next day. Ibuprofen helped and I am just sore this morning.      TREATMENT AREA =  Other low back pain [M54.59]     OBJECTIVE    Modalities Rationale:     decrease edema, decrease inflammation, decrease pain, and increase tissue extensibility to improve patient's ability to progress to PLOF and address remaining functional goals.     min [] Estim Unattended, type/location:                                      []  w/ice    []  w/heat    min [] Estim Attended, type/location:                                     []  w/US     []  w/ice    []  w/heat    []  TENS insruct      min []  Mechanical Traction: type/lbs                   []  pro   []  sup   []  int   []  cont    []  before manual    []  after manual    min []  Ultrasound, settings/location:     10 min  unbill [x]  Ice     []  Heat    location/position: Supine low back     min []  Paraffin,  details:     min []  Vasopneumatic Device, press/temp:     min []  Whirlpool / Fluido:    If using vaso (only need to measure limb vaso being performed on)      pre-treatment girth :       post-treatment girth :       measured at (landmark location) :      min []  Other:    Skin assessment post-treatment:   Intact      Therapeutic Procedures:    Tx Min Billable or 1:1 Min (if diff from Tx Min) Procedure, Rationale, Specifics   25 18 29552 Therapeutic Exercise (timed):  increase ROM, strength, coordination, balance, and proprioception to improve patient's ability to progress to PLOF and address remaining functional goals. (see flow

## 2024-05-24 ENCOUNTER — HOSPITAL ENCOUNTER (OUTPATIENT)
Facility: HOSPITAL | Age: 74
Setting detail: RECURRING SERIES
Discharge: HOME | End: 2024-05-27
Payer: MEDICARE

## 2024-05-24 PROCEDURE — 97110 THERAPEUTIC EXERCISES: CPT

## 2024-05-24 PROCEDURE — 97140 MANUAL THERAPY 1/> REGIONS: CPT

## 2024-05-24 PROCEDURE — 97112 NEUROMUSCULAR REEDUCATION: CPT

## 2024-05-24 NOTE — PROGRESS NOTES
PHYSICAL / OCCUPATIONAL THERAPY - DAILY TREATMENT NOTE    Patient Name: Anay Baird    Date: 2024    : 1950  Insurance: Payor: MEDICARE / Plan: MEDICARE PART A AND B / Product Type: *No Product type* /      Patient  verified Yes     Visit #   Current / Total 13 36   Time   In / Out 11:01 am 12:01 pm   Pain   In / Out 1 2   Subjective Functional Status/Changes: Pt reports feeling good after last session lasting 1-2 days. Soreness in left low back and right hip resolved with alieve.     TREATMENT AREA =  Other low back pain [M54.59]     OBJECTIVE    Modalities Rationale:     decrease edema, decrease inflammation, decrease pain, and increase tissue extensibility to improve patient's ability to progress to PLOF and address remaining functional goals.     min [] Estim Unattended, type/location:                                      []  w/ice    []  w/heat    min [] Estim Attended, type/location:                                     []  w/US     []  w/ice    []  w/heat    []  TENS insruct      min []  Mechanical Traction: type/lbs                   []  pro   []  sup   []  int   []  cont    []  before manual    []  after manual    min []  Ultrasound, settings/location:     10 min  unbill [x]  Ice     []  Heat    location/position: Supine low back     min []  Paraffin,  details:     min []  Vasopneumatic Device, press/temp:     min []  Whirlpool / Fluido:    If using vaso (only need to measure limb vaso being performed on)      pre-treatment girth :       post-treatment girth :       measured at (landmark location) :      min []  Other:    Skin assessment post-treatment:   Intact      Therapeutic Procedures:    Tx Min Billable or 1:1 Min (if diff from Tx Min) Procedure, Rationale, Specifics   23  32230 Therapeutic Exercise (timed):  increase ROM, strength, coordination, balance, and proprioception to improve patient's ability to progress to PLOF and address remaining functional goals. (see flow sheet as

## 2024-05-29 ENCOUNTER — APPOINTMENT (OUTPATIENT)
Facility: HOSPITAL | Age: 74
End: 2024-05-29
Payer: MEDICARE

## 2024-05-31 ENCOUNTER — HOSPITAL ENCOUNTER (OUTPATIENT)
Facility: HOSPITAL | Age: 74
Setting detail: RECURRING SERIES
End: 2024-05-31
Payer: MEDICARE

## 2024-05-31 PROCEDURE — 97110 THERAPEUTIC EXERCISES: CPT | Performed by: PHYSICAL THERAPIST

## 2024-05-31 PROCEDURE — 97140 MANUAL THERAPY 1/> REGIONS: CPT | Performed by: PHYSICAL THERAPIST

## 2024-05-31 NOTE — PROGRESS NOTES
PHYSICAL / OCCUPATIONAL THERAPY - DAILY TREATMENT NOTE    Patient Name: Anay Baird    Date: 2024    : 1950  Insurance: Payor: MEDICARE / Plan: MEDICARE PART A AND B / Product Type: *No Product type* /      Patient  verified Yes     Visit #   Current / Total 14 36   Time   In / Out 1020am 1110am   Pain   In / Out 1 1   Subjective Functional Status/Changes: Pt reports she is about the same, but the massage helped for a while last session.     TREATMENT AREA =  Other low back pain [M54.59]     OBJECTIVE    Modalities Rationale:     decrease edema, decrease inflammation, decrease pain, and increase tissue extensibility to improve patient's ability to progress to PLOF and address remaining functional goals.     min [] Estim Unattended, type/location:                                      []  w/ice    []  w/heat    min [] Estim Attended, type/location:                                     []  w/US     []  w/ice    []  w/heat    []  TENS insruct      min []  Mechanical Traction: type/lbs                   []  pro   []  sup   []  int   []  cont    []  before manual    []  after manual    min []  Ultrasound, settings/location:     PD min  unbill [x]  Ice     []  Heat    location/position: Supine low back     min []  Paraffin,  details:     min []  Vasopneumatic Device, press/temp:     min []  Whirlpool / Fluido:    If using vaso (only need to measure limb vaso being performed on)      pre-treatment girth :       post-treatment girth :       measured at (landmark location) :      min []  Other:    Skin assessment post-treatment:   Intact      Therapeutic Procedures:    Tx Min Billable or 1:1 Min (if diff from Tx Min) Procedure, Rationale, Specifics   27  69551 Therapeutic Exercise (timed):  increase ROM, strength, coordination, balance, and proprioception to improve patient's ability to progress to PLOF and address remaining functional goals. (see flow sheet as applicable)     Details if applicable:          60237 Neuromuscular Re-Education (timed):  improve balance, coordination, kinesthetic sense, posture, core stability and proprioception to improve patient's ability to develop conscious control of individual muscles and awareness of position of extremities in order to progress to PLOF and address remaining functional goals. (see flow sheet as applicable)     Details if applicable:     23  05016 Manual Therapy (timed):  decrease pain, increase ROM, increase tissue extensibility, and decrease trigger points to improve patient's ability to progress to PLOF and address remaining functional goals.  The manual therapy interventions were performed at a separate and distinct time from the therapeutic activities interventions . (see flow sheet as applicable)     Details if applicable:  R SIJ mobes, prone TrPt release to lower R QL mm, glut medius/superior glut max (R)          Details if applicable:            Details if applicable:     50  [facility] Totals Reminder: bill using total billable min of TIMED therapeutic procedures (example: do not include dry needle or estim unattended, both untimed codes, in totals to left)  8-22 min = 1 unit; 23-37 min = 2 units; 38-52 min = 3 units; 53-67 min = 4 units; 68-82 min = 5 units   Total Total     [x]  Patient Education billed concurrently with other procedures   [x] Review HEP    [] Progressed/Changed HEP, detail:    [] Other detail:       Objective Information/Functional Measures/Assessment    Pt presents with a mod TrP in QL mm and upper glut mm, performed DTM and TrP R and QL S in sidelying.  Modified to prone with 3 pillows for hip extension  Focus today on hip and glut strengthening.     Patient will continue to benefit from skilled PT / OT services to modify and progress therapeutic interventions, analyze and address functional mobility deficits, analyze and address ROM deficits, analyze and address strength deficits, analyze and address soft tissue restrictions, analyze and cue

## 2024-06-03 ENCOUNTER — HOSPITAL ENCOUNTER (OUTPATIENT)
Facility: HOSPITAL | Age: 74
Setting detail: RECURRING SERIES
Discharge: HOME | End: 2024-06-06
Payer: MEDICARE

## 2024-06-03 PROCEDURE — 97140 MANUAL THERAPY 1/> REGIONS: CPT

## 2024-06-03 PROCEDURE — 97110 THERAPEUTIC EXERCISES: CPT

## 2024-06-03 PROCEDURE — 97112 NEUROMUSCULAR REEDUCATION: CPT

## 2024-06-03 NOTE — PROGRESS NOTES
Neuromuscular Re-Education (timed):  improve balance, coordination, kinesthetic sense, posture, core stability and proprioception to improve patient's ability to develop conscious control of individual muscles and awareness of position of extremities in order to progress to PLOF and address remaining functional goals. (see flow sheet as applicable)     Details if applicable:     12  45737 Manual Therapy (timed):  decrease pain, increase ROM, increase tissue extensibility, decrease edema, and decrease trigger points to improve patient's ability to progress to PLOF and address remaining functional goals.  The manual therapy interventions were performed at a separate and distinct time from the therapeutic activities interventions . (see flow sheet as applicable)     Details if applicable:   corrected right anterior innominate with MET; review self correction; prone TrPt release to lower lumbar paraspinals,piriformis, glut medius           Details if applicable:            Details if applicable:     38  [facility] Totals Reminder: bill using total billable min of TIMED therapeutic procedures (example: do not include dry needle or estim unattended, both untimed codes, in totals to left)  8-22 min = 1 unit; 23-37 min = 2 units; 38-52 min = 3 units; 53-67 min = 4 units; 68-82 min = 5 units   Total Total     [x]  Patient Education billed concurrently with other procedures   [x] Review HEP    [] Progressed/Changed HEP, detail:    [] Other detail:       Objective Information/Functional Measures/Assessment    Pt reporting right hip pain/soreness during the first 1-2 steps in her 2nd set of standing march on foam. Standing exercises held at that time. Corrected right anterior innominate with MET. Pt may benefit from SI lock belt. Reviewed self MET correction and core stabilization exercises in supine. TrPt tenderness noted in R piriformis along lateral sacral border.     Patient will continue to benefit from skilled PT / OT services

## 2024-06-07 ENCOUNTER — HOSPITAL ENCOUNTER (OUTPATIENT)
Facility: HOSPITAL | Age: 74
Setting detail: RECURRING SERIES
Discharge: HOME | End: 2024-06-10
Payer: MEDICARE

## 2024-06-07 PROCEDURE — 97530 THERAPEUTIC ACTIVITIES: CPT

## 2024-06-07 PROCEDURE — 97112 NEUROMUSCULAR REEDUCATION: CPT

## 2024-06-07 PROCEDURE — 97110 THERAPEUTIC EXERCISES: CPT

## 2024-06-07 NOTE — PROGRESS NOTES
PHYSICAL / OCCUPATIONAL THERAPY - DAILY TREATMENT NOTE    Patient Name: Anay Baird    Date: 2024    : 1950  Insurance: Payor: MEDICARE / Plan: MEDICARE PART A AND B / Product Type: *No Product type* /      Patient  verified Yes     Visit #   Current / Total 16 36   Time   In / Out 9:32 am 10:47   Pain   In / Out 1 4   Subjective Functional Status/Changes: Pt reports ~ 75% overall improvement.   Pain range: 0 to 4/10   Yesterday my back was bothering me     TREATMENT AREA =  Other low back pain [M54.59]     OBJECTIVE    Modalities Rationale:     decrease edema, decrease inflammation, decrease pain, and increase tissue extensibility to improve patient's ability to progress to PLOF and address remaining functional goals.     min [] Estim Unattended, type/location:                                      []  w/ice    []  w/heat    min [] Estim Attended, type/location:                                     []  w/US     []  w/ice    []  w/heat    []  TENS insruct      min []  Mechanical Traction: type/lbs                   []  pro   []  sup   []  int   []  cont    []  before manual    []  after manual    min []  Ultrasound, settings/location:     10 min  unbill [x]  Ice     []  Heat    location/position: Supine low back     min []  Paraffin,  details:     min []  Vasopneumatic Device, press/temp:     min []  Whirlpool / Fluido:    If using vaso (only need to measure limb vaso being performed on)      pre-treatment girth :       post-treatment girth :       measured at (landmark location) :      min []  Other:    Skin assessment post-treatment:   Intact      Therapeutic Procedures:    Tx Min Billable or 1:1 Min (if diff from Tx Min) Procedure, Rationale, Specifics   35 12 [id number] Therapeutic Exercise (timed):  increase ROM, strength, coordination, balance, and proprioception to improve patient's ability to progress to PLOF and address remaining functional goals. (see flow sheet as applicable)     Details if 
4/10   Goal met? Goal in progress     Long Term Goals: To be accomplished in 36 treatments  1. Patient to be independent & compliant with progressive HEP in preparation for D/C.   Status at IE   Current: 6/7/24= D/C HEP in progress  Goal met? Goal in progress    2.  Patient to increase FOTO score to 64 indicating improved functional abilities and QOL.   Status at IE 54  PN:58  Current: 6/7/24:  FOTO: 67   Goal met? Yes       3.  Patient to increase strength in B hip extension and ABD to 4+ to facilitate lumbo-pelvic stability.  Status at IE   Gluteus José Antonio 4- 4- []    Hip ABD 4 4 []    PN: Hip extension: 4+ Damian, Hip ABD: L: 4, R 4+   Current: 6/7/24 Hip ABD: L: 4 +-pain, R: 4+  Goal met? Goal in progress     4.  Patient able to perform 5 reps 6 in step down with good eccentric control Damian to facilitate descending stairs.   Status at IE decreased eccentric control on L knee., pain on R: fair control  PN: pain and decreased control with 6 in tap down, able to perform 4inch with mod cues.    Current: mild pain with 6 inch step downs and DAMIAN UE support  Goal met? Goal in progress     Payor: MEDICARE / Plan: MEDICARE PART A AND B / Product Type: *No Product type* /      Medicare, cannot change goals, cannot adjust frequency/duration, no signature required   Reporting Period: (date from last Prog Note/Eval to current Prog Note/Recert)  5/6/24- 6/7/24    Payor: MEDICARE / Plan: MEDICARE PART A AND B / Product Type: *No Product type* /         RECOMMENDATIONS  Patient would benefit from the continuation of skilled rehab interventions, per initial Plan of Care or most recent Medicare Recert, for functional progress to achieving above stated clinically significant goals.    If you have any questions/comments please contact us directly.  Thank you for allowing us to assist in the care of your patient.    Ted Holder, PTA       6/7/2024       10:32 AM

## 2024-06-14 ENCOUNTER — HOSPITAL ENCOUNTER (OUTPATIENT)
Facility: HOSPITAL | Age: 74
Setting detail: RECURRING SERIES
Discharge: HOME | End: 2024-06-17
Payer: MEDICARE

## 2024-06-14 PROCEDURE — 97110 THERAPEUTIC EXERCISES: CPT

## 2024-06-14 PROCEDURE — 97530 THERAPEUTIC ACTIVITIES: CPT

## 2024-06-14 PROCEDURE — 97112 NEUROMUSCULAR REEDUCATION: CPT

## 2024-06-14 NOTE — PROGRESS NOTES
PHYSICAL / OCCUPATIONAL THERAPY - DAILY TREATMENT NOTE    Patient Name: Anay Baird    Date: 2024    : 1950  Insurance: Payor: MEDICARE / Plan: MEDICARE PART A AND B / Product Type: *No Product type* /      Patient  verified Yes     Visit #   Current / Total 17 36   Time   In / Out 9: 46 am 10:45 am   Pain   In / Out 2.6 0   Subjective Functional Status/Changes: Pt reports right hip started to get sore after she got in the car to drive over here. Self progressed 2x 20 reps of supine SLR in HEP this week. Sx intermittent still      TREATMENT AREA =  Other low back pain [M54.59]     OBJECTIVE    Modalities Rationale:     decrease edema, decrease inflammation, decrease pain, and increase tissue extensibility to improve patient's ability to progress to PLOF and address remaining functional goals.     min [] Estim Unattended, type/location:                                      []  w/ice    []  w/heat    min [] Estim Attended, type/location:                                     []  w/US     []  w/ice    []  w/heat    []  TENS insruct      min []  Mechanical Traction: type/lbs                   []  pro   []  sup   []  int   []  cont    []  before manual    []  after manual    min []  Ultrasound, settings/location:     10 min  unbill [x]  Ice     []  Heat    location/position: Supine low back     min []  Paraffin,  details:     min []  Vasopneumatic Device, press/temp:     min []  Whirlpool / Fluido:    If using vaso (only need to measure limb vaso being performed on)      pre-treatment girth :       post-treatment girth :       measured at (landmark location) :      min []  Other:    Skin assessment post-treatment:   Intact      Therapeutic Procedures:    Tx Min Billable or 1:1 Min (if diff from Tx Min) Procedure, Rationale, Specifics   13  [id number] Neuromuscular Re-Education (timed):  improve balance, coordination, kinesthetic sense, posture, core stability and proprioception to improve patient's ability to

## 2024-06-20 ENCOUNTER — HOSPITAL ENCOUNTER (OUTPATIENT)
Facility: HOSPITAL | Age: 74
Setting detail: RECURRING SERIES
Discharge: HOME | End: 2024-06-23
Payer: MEDICARE

## 2024-06-20 PROCEDURE — 97530 THERAPEUTIC ACTIVITIES: CPT

## 2024-06-20 PROCEDURE — 97112 NEUROMUSCULAR REEDUCATION: CPT

## 2024-06-20 PROCEDURE — 97110 THERAPEUTIC EXERCISES: CPT

## 2024-06-20 NOTE — PROGRESS NOTES
PHYSICAL / OCCUPATIONAL THERAPY - DAILY TREATMENT NOTE    Patient Name: Anay Baird    Date: 2024    : 1950  Insurance: Payor: MEDICARE / Plan: MEDICARE PART A AND B / Product Type: *No Product type* /      Patient  verified Yes     Visit #   Current / Total 18 36   Time   In / Out 1:43 pm 2:34 pm   Pain   In / Out 0 0   Subjective Functional Status/Changes: Pt reports feeling ~ 90% overall improvement. Pain range: 0 to 2/10 . Soreness with prolonged sitting persists.      TREATMENT AREA =  Other low back pain [M54.59]     OBJECTIVE    Modalities Rationale:     decrease edema, decrease inflammation, decrease pain, and increase tissue extensibility to improve patient's ability to progress to PLOF and address remaining functional goals.     min [] Estim Unattended, type/location:                                      []  w/ice    []  w/heat    min [] Estim Attended, type/location:                                     []  w/US     []  w/ice    []  w/heat    []  TENS insruct      min []  Mechanical Traction: type/lbs                   []  pro   []  sup   []  int   []  cont    []  before manual    []  after manual    min []  Ultrasound, settings/location:     10 min  unbill [x]  Ice     []  Heat    location/position: Supine low back    min []  Paraffin,  details:     min []  Vasopneumatic Device, press/temp:     min []  Whirlpool / Fluido:    If using vaso (only need to measure limb vaso being performed on)      pre-treatment girth :       post-treatment girth :       measured at (landmark location) :      min []  Other:    Skin assessment post-treatment:   Intact      Therapeutic Procedures:    Tx Min Billable or 1:1 Min (if diff from Tx Min) Procedure, Rationale, Specifics   15  02028 Therapeutic Exercise (timed):  increase ROM, strength, coordination, balance, and proprioception to improve patient's ability to progress to PLOF and address remaining functional goals. (see flow sheet as applicable)

## 2024-06-20 NOTE — DISCHARGE SUMMARY
[facility]  [address], [address], [place] [address] Ph: [phone] Fx: [phone]  DISCHARGE SUMMARY FOR PHYSICAL THERAPY          Patient Name: Anay Baird : 1950   Treatment/Medical Diagnosis: Other low back pain [M54.59]   Onset Date: 2023     Referral Source: David Mcclain PA Start of Care (SOC):   2024      Prior Hospitalization: See Medical History Provider #: [id number]   Prior Level of Function:   Indep      Comorbidities: OA, back pain, prior sx    Medications: Verified on Patient Summary List   Visits from SOC: 18 Missed Visits: 1     Key Functional Changes/Progress:   Pt has demonstrated excellent progress in physical therapy consistently reporting decreasing pain with ADLs, improving core strength and functional mobility. FOTO score improved from  54 @ initial evaluation to 72 indicating improving functional mobility.   Pain range: 0- 2/10 and pt reporting ~ 90% overall improvement in sx with ADLs.   GROC: +6 a great deal better.     Short Term Goals: To be accomplished in 6 weeks   Patient to be Indep and compliant with initial HEP to address above listed deficits.  Status at IE established at IE, educated at first FU  PN: 24 - goal in progress  Current: Pt independent and compliant with initial HEP.   Goal met? Yes    2.   Patient to increase ROM EC on foam to 30 sec for improved stability/balance.   IE: 3 sec  Current: 24 EO ROM foam ~ 30 seconds   Goal met? Yes    3.  Patient to present with negative Ely Damian indicating improved quad length and hip mobility with gait/transfers.   IE: + Damian  Current status: - Damian  Goal met? Yes    4.  Patient to report pain no >than 3 to facilitate return to walking dog.   IE: 3-5  Pain range: 0 to 2/10   Goal met? Yes    Long Term Goals: To be accomplished in 36 treatments  1. Patient to be independent & compliant with progressive HEP in preparation for D/C.   Status at